# Patient Record
Sex: FEMALE | Race: WHITE | Employment: OTHER | URBAN - METROPOLITAN AREA
[De-identification: names, ages, dates, MRNs, and addresses within clinical notes are randomized per-mention and may not be internally consistent; named-entity substitution may affect disease eponyms.]

---

## 2018-02-08 ENCOUNTER — APPOINTMENT (OUTPATIENT)
Dept: GENERAL RADIOLOGY | Facility: HOSPITAL | Age: 73
End: 2018-02-08
Payer: OTHER MISCELLANEOUS

## 2018-02-08 ENCOUNTER — HOSPITAL ENCOUNTER (EMERGENCY)
Facility: HOSPITAL | Age: 73
Discharge: HOME | End: 2018-02-08
Attending: EMERGENCY MEDICINE
Payer: OTHER MISCELLANEOUS

## 2018-02-08 VITALS
DIASTOLIC BLOOD PRESSURE: 57 MMHG | HEART RATE: 74 BPM | HEIGHT: 64 IN | TEMPERATURE: 98 F | RESPIRATION RATE: 16 BRPM | SYSTOLIC BLOOD PRESSURE: 140 MMHG | WEIGHT: 131 LBS | OXYGEN SATURATION: 99 % | BODY MASS INDEX: 22.36 KG/M2

## 2018-02-08 DIAGNOSIS — S43.401A SPRAIN OF RIGHT SHOULDER, UNSPECIFIED SHOULDER SPRAIN TYPE, INITIAL ENCOUNTER: Primary | ICD-10-CM

## 2018-02-08 DIAGNOSIS — S80.01XA CONTUSION OF RIGHT KNEE, INITIAL ENCOUNTER: ICD-10-CM

## 2018-02-08 PROCEDURE — 73030 X-RAY EXAM OF SHOULDER: CPT

## 2018-02-08 PROCEDURE — 73010 X-RAY EXAM OF SHOULDER BLADE: CPT

## 2018-02-08 PROCEDURE — 99284 EMERGENCY DEPT VISIT MOD MDM: CPT

## 2018-02-08 PROCEDURE — 71101 X-RAY EXAM UNILAT RIBS/CHEST: CPT

## 2018-02-08 PROCEDURE — 73562 X-RAY EXAM OF KNEE 3: CPT

## 2018-02-08 NOTE — ED INITIAL ASSESSMENT (HPI)
Pt work in [employer] ([place]). Was coming in to the building when she tripped on a rug and fall landing on right side. Pt complaining of right shoulder, right scapula, and right knee pain.

## 2018-02-08 NOTE — ED PROVIDER NOTES
Patient Seen in: [facility] Emergency Department    History   Patient presents with:  Trauma (cardiovascular, musculoskeletal)    Stated Complaint: fall, right knee pain, right shoulder, right scapula pain    HPI    Patient presents with right-sided midline C-spine tenderness. Cardiovascular: Regular rate and rhythm, no murmurs. Respiratory: Lungs clear to auscultation. No chest wall tenderness palpation or crepitus.   Abdomen: Soft, nontender, no rebound or guarding, normal active bowel sounds, no fracture, or deformity.  Dictated by: Lacho Medel MD on 2/08/2018 at 14:37     Approved by: Lacho Medel MD            Xr Shoulder, Complete (min 2 Views), Right (cpt=73030)    Result Date: 2/8/2018  PROCEDURE:  XR SHOULDER, COMPLETE (MIN 2 VIEWS), R 1520  ------------------------------------------------------------       MDM     The patient's imaging shows no fracture. I think she has a shoulder sprain and knee contusion. She does not feel that she needs narcotic pain medication.   She is comfortable

## 2018-02-09 ENCOUNTER — APPOINTMENT (OUTPATIENT)
Dept: OTHER | Facility: HOSPITAL | Age: 73
End: 2018-02-09
Attending: PREVENTIVE MEDICINE
Payer: OTHER MISCELLANEOUS

## 2018-02-13 ENCOUNTER — OFFICE VISIT (OUTPATIENT)
Dept: OTHER | Facility: HOSPITAL | Age: 73
End: 2018-02-13
Attending: FAMILY MEDICINE
Payer: OTHER MISCELLANEOUS

## 2018-02-13 DIAGNOSIS — M54.9 BACK PAIN: ICD-10-CM
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
## 2018-02-13 DIAGNOSIS — S46.911A RIGHT SHOULDER STRAIN: ICD-10-CM

## 2018-02-13 RX ORDER — CYCLOBENZAPRINE HCL 5 MG
TABLET ORAL
Qty: 10 TABLET | Refills: 0 | Status: SHIPPED | OUTPATIENT
Start: 2018-02-13 | End: 2018-07-30 | Stop reason: ALTCHOICE

## 2018-02-13 NOTE — PATIENT INSTRUCTIONS
Heat to back    Back exercises given    Flexeril 5 mg. . One at bedtime as needed          Cyclobenzaprine tablets  Brand Names: Fexmid, Flexeril  What is this medicine? CYCLOBENZAPRINE (sye kloe KAT za preen) is a muscle relaxer.  It is used to treat muscl medicines for infection like alfuzosin, chloroquine, clarithromycin, levofloxacin, mefloquine, pentamidine, troleandomycin  · certain medicines for irregular heart beat  · certain medicines for seizures like phenobarbital, primidone  · contrast dyes  · gen drinks. If you are taking another medicine that also causes drowsiness, you may have more side effects. Give your health care provider a list of all medicines you use. Your doctor will tell you how much medicine to take.  Do not take more medicine than dir

## 2018-02-15 ENCOUNTER — OFFICE VISIT (OUTPATIENT)
Dept: INTERNAL MEDICINE CLINIC | Facility: CLINIC | Age: 73
End: 2018-02-15

## 2018-02-15 VITALS
HEART RATE: 92 BPM | BODY MASS INDEX: 22 KG/M2 | RESPIRATION RATE: 16 BRPM | SYSTOLIC BLOOD PRESSURE: 106 MMHG | OXYGEN SATURATION: 98 % | TEMPERATURE: 98 F | DIASTOLIC BLOOD PRESSURE: 64 MMHG | WEIGHT: 130 LBS

## 2018-02-15 DIAGNOSIS — Z12.39 BREAST CANCER SCREENING: ICD-10-CM

## 2018-02-15 DIAGNOSIS — R11.0 NAUSEA: ICD-10-CM

## 2018-02-15 DIAGNOSIS — S43.401D SPRAIN OF RIGHT SHOULDER, UNSPECIFIED SHOULDER SPRAIN TYPE, SUBSEQUENT ENCOUNTER: ICD-10-CM

## 2018-02-15 DIAGNOSIS — F17.200 TOBACCO USE DISORDER: ICD-10-CM

## 2018-02-15 DIAGNOSIS — Z78.0 POSTMENOPAUSAL STATE: ICD-10-CM

## 2018-02-15 DIAGNOSIS — K21.9 GASTROESOPHAGEAL REFLUX DISEASE, ESOPHAGITIS PRESENCE NOT SPECIFIED: Primary | ICD-10-CM

## 2018-02-15 DIAGNOSIS — Z12.11 COLON CANCER SCREENING: ICD-10-CM

## 2018-02-15 DIAGNOSIS — H26.9 CATARACT OF BOTH EYES, UNSPECIFIED CATARACT TYPE: ICD-10-CM

## 2018-02-15 DIAGNOSIS — M19.041 OSTEOARTHRITIS OF BOTH HANDS, UNSPECIFIED OSTEOARTHRITIS TYPE: ICD-10-CM

## 2018-02-15 DIAGNOSIS — M19.042 OSTEOARTHRITIS OF BOTH HANDS, UNSPECIFIED OSTEOARTHRITIS TYPE: ICD-10-CM

## 2018-02-15 PROBLEM — F32.A DEPRESSIVE DISORDER: Status: ACTIVE | Noted: 2018-02-15

## 2018-02-15 PROCEDURE — 99204 OFFICE O/P NEW MOD 45 MIN: CPT | Performed by: STUDENT IN AN ORGANIZED HEALTH CARE EDUCATION/TRAINING PROGRAM

## 2018-02-15 RX ORDER — ACETAMINOPHEN 325 MG/1
650 TABLET ORAL EVERY 6 HOURS PRN
Qty: 60 TABLET | Refills: 0 | COMMUNITY
Start: 2018-02-15 | End: 2019-04-22

## 2018-02-15 NOTE — PROGRESS NOTES
[facility]    REASON FOR VISIT:    Current Complaints: Patient presents with:  ER F/U: [place]  Nausea: When drinks coffe. HPI:  Sussy Garcia is a 71year old female who presents for ER visit f/u. She is a new patient to the office.   Олег Peoples denies SI/HI. ALLERGIES:   No Known Allergies    CURRENT MEDICATIONS:     Current Outpatient Prescriptions:  Omeprazole 40 MG Oral Capsule Delayed Release Take 1 capsule (40 mg total) by mouth daily.  Disp: 30 capsule Rfl: 0   acetaminophen 325 MG Oral round, and reactive to light. No scleral icterus. Neck: Normal range of motion. No thyromegaly present. Cardiovascular: Normal rate, regular rhythm and normal heart sounds. Exam reveals no friction rub. No murmur heard.   Pulmonary/Chest: Effort nor osteoarthritis type  -     acetaminophen 325 MG Oral Tab; Take 2 tablets (650 mg total) by mouth every 6 (six) hours as needed for Pain. Medications side effects, risk and benefits discussed in length.  After visit instructions are provided to the hoang

## 2018-02-19 PROBLEM — M19.041 OSTEOARTHRITIS OF BOTH HANDS: Status: ACTIVE | Noted: 2018-02-19

## 2018-02-19 PROBLEM — M19.042 OSTEOARTHRITIS OF BOTH HANDS: Status: ACTIVE | Noted: 2018-02-19

## 2018-02-19 RX ORDER — OMEPRAZOLE 40 MG/1
40 CAPSULE, DELAYED RELEASE ORAL DAILY
Qty: 30 CAPSULE | Refills: 0 | COMMUNITY
Start: 2018-02-19 | End: 2018-08-14 | Stop reason: ALTCHOICE

## 2018-02-20 ENCOUNTER — APPOINTMENT (OUTPATIENT)
Dept: OTHER | Facility: HOSPITAL | Age: 73
End: 2018-02-20
Attending: PREVENTIVE MEDICINE
Payer: OTHER MISCELLANEOUS

## 2018-02-23 ENCOUNTER — HOSPITAL ENCOUNTER (OUTPATIENT)
Dept: BONE DENSITY | Age: 73
Discharge: HOME | End: 2018-02-23
Attending: STUDENT IN AN ORGANIZED HEALTH CARE EDUCATION/TRAINING PROGRAM
Payer: COMMERCIAL

## 2018-02-23 ENCOUNTER — HOSPITAL ENCOUNTER (OUTPATIENT)
Dept: MAMMOGRAPHY | Age: 73
Discharge: HOME | End: 2018-02-23
Attending: STUDENT IN AN ORGANIZED HEALTH CARE EDUCATION/TRAINING PROGRAM
Payer: COMMERCIAL

## 2018-02-23 DIAGNOSIS — Z78.0 POSTMENOPAUSAL STATE: ICD-10-CM

## 2018-02-23 DIAGNOSIS — Z12.39 BREAST CANCER SCREENING: ICD-10-CM

## 2018-02-23 PROCEDURE — 77067 SCR MAMMO BI INCL CAD: CPT | Performed by: STUDENT IN AN ORGANIZED HEALTH CARE EDUCATION/TRAINING PROGRAM

## 2018-02-23 PROCEDURE — 77080 DXA BONE DENSITY AXIAL: CPT | Performed by: STUDENT IN AN ORGANIZED HEALTH CARE EDUCATION/TRAINING PROGRAM

## 2018-02-28 ENCOUNTER — OFFICE VISIT (OUTPATIENT)
Dept: INTERNAL MEDICINE CLINIC | Facility: CLINIC | Age: 73
End: 2018-02-28

## 2018-02-28 VITALS
BODY MASS INDEX: 22 KG/M2 | SYSTOLIC BLOOD PRESSURE: 106 MMHG | DIASTOLIC BLOOD PRESSURE: 66 MMHG | OXYGEN SATURATION: 98 % | WEIGHT: 130 LBS | RESPIRATION RATE: 16 BRPM | TEMPERATURE: 98 F | HEART RATE: 86 BPM

## 2018-02-28 DIAGNOSIS — M19.041 OSTEOARTHRITIS OF BOTH HANDS, UNSPECIFIED OSTEOARTHRITIS TYPE: ICD-10-CM

## 2018-02-28 DIAGNOSIS — M81.0 OSTEOPOROSIS OF MULTIPLE SITES WITHOUT PATHOLOGICAL FRACTURE: Primary | ICD-10-CM

## 2018-02-28 DIAGNOSIS — M19.042 OSTEOARTHRITIS OF BOTH HANDS, UNSPECIFIED OSTEOARTHRITIS TYPE: ICD-10-CM

## 2018-02-28 PROCEDURE — 99214 OFFICE O/P EST MOD 30 MIN: CPT | Performed by: STUDENT IN AN ORGANIZED HEALTH CARE EDUCATION/TRAINING PROGRAM

## 2018-03-01 ENCOUNTER — TELEPHONE (OUTPATIENT)
Dept: INTERNAL MEDICINE CLINIC | Facility: CLINIC | Age: 73
End: 2018-03-01

## 2018-03-01 NOTE — TELEPHONE ENCOUNTER
Dr. Agnes Melendez has placed referral for Endo. She can see Dr. Sandy Perez or any of her partners.      Estephanie Trotter MD  Endocrinology, Diabetes and Metabolism  [facility] [address]  [address]  Phone: [phone]  Fax: [phone]    Patient is

## 2018-03-01 NOTE — TELEPHONE ENCOUNTER
Patient called back, and I provided Dr. Pollard Empmateor information; she will call to schedule an appointment.

## 2018-03-02 ENCOUNTER — MED REC SCAN ONLY (OUTPATIENT)
Dept: INTERNAL MEDICINE CLINIC | Facility: CLINIC | Age: 73
End: 2018-03-02

## 2018-03-02 NOTE — PROGRESS NOTES
[facility]    REASON FOR VISIT:    Current Complaints: Patient presents with:  Osteoporosis: Discussion      HPI:  Mary Baron is a 71year old female who presents today for recent DEXA scan report review.   Patient's DEXA scan was positive fo for urgency, frequency and difficulty urinating. Musculoskeletal: Negative for arthralgias and gait problem. Skin: Negative for color change and rash.      EXAM:   /66   Pulse 86   Temp 98 °F (36.7 °C) (Oral)   Resp 16   Wt 130 lb   SpO2 98%   BMI for pain control the previous visit. Patient reports improvement in symptoms. After visit instructions are provided to the patient. The patient indicates understanding of these issues and agrees to the treatment plan.   The patient is asked to return

## 2018-03-08 ENCOUNTER — HOSPITAL ENCOUNTER (OUTPATIENT)
Dept: MAMMOGRAPHY | Facility: HOSPITAL | Age: 73
Discharge: HOME | End: 2018-03-08
Attending: STUDENT IN AN ORGANIZED HEALTH CARE EDUCATION/TRAINING PROGRAM
Payer: COMMERCIAL

## 2018-03-08 DIAGNOSIS — R92.2 INCONCLUSIVE MAMMOGRAM: ICD-10-CM

## 2018-03-08 PROCEDURE — 77061 BREAST TOMOSYNTHESIS UNI: CPT | Performed by: STUDENT IN AN ORGANIZED HEALTH CARE EDUCATION/TRAINING PROGRAM

## 2018-03-08 PROCEDURE — 76642 ULTRASOUND BREAST LIMITED: CPT | Performed by: STUDENT IN AN ORGANIZED HEALTH CARE EDUCATION/TRAINING PROGRAM

## 2018-03-08 PROCEDURE — 77065 DX MAMMO INCL CAD UNI: CPT | Performed by: STUDENT IN AN ORGANIZED HEALTH CARE EDUCATION/TRAINING PROGRAM

## 2018-03-09 ENCOUNTER — TELEPHONE (OUTPATIENT)
Dept: INTERNAL MEDICINE CLINIC | Facility: CLINIC | Age: 73
End: 2018-03-09

## 2018-03-09 DIAGNOSIS — R92.8 ABNORMAL MAMMOGRAM: Primary | ICD-10-CM

## 2018-03-09 DIAGNOSIS — R92.2 DENSE BREAST TISSUE ON MAMMOGRAM: ICD-10-CM

## 2018-03-09 NOTE — TELEPHONE ENCOUNTER
----- Message from Kojo Amador MD sent at 3/9/2018  2:27 AM CST -----  Please, notify the patient that she has some focal asymmetry in the upper outer quadrant of the left breast. Findings likely represent asymmetric breast parenchyma.  Recommend 6 ranjit

## 2018-03-09 NOTE — TELEPHONE ENCOUNTER
----- Message from Sumit Corral MD sent at 3/9/2018  2:27 AM CST -----  Please, notify the patient that she has some focal asymmetry in the upper outer quadrant of the left breast. Findings likely represent asymmetric breast parenchyma.  Recommend 6 ranjit

## 2018-03-12 ENCOUNTER — OFFICE VISIT (OUTPATIENT)
Dept: OTHER | Facility: HOSPITAL | Age: 73
End: 2018-03-12
Attending: PREVENTIVE MEDICINE
Payer: OTHER MISCELLANEOUS
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
## 2018-03-12 DIAGNOSIS — M25.551 RIGHT HIP PAIN: ICD-10-CM

## 2018-03-12 DIAGNOSIS — M54.50 LUMBAR PAIN: ICD-10-CM

## 2018-03-26 ENCOUNTER — APPOINTMENT (OUTPATIENT)
Dept: OTHER | Facility: HOSPITAL | Age: 73
End: 2018-03-26
Attending: PREVENTIVE MEDICINE
Payer: OTHER MISCELLANEOUS

## 2018-03-28 ENCOUNTER — OFFICE VISIT (OUTPATIENT)
Dept: INTERNAL MEDICINE CLINIC | Facility: CLINIC | Age: 73
End: 2018-03-28

## 2018-03-28 VITALS
SYSTOLIC BLOOD PRESSURE: 118 MMHG | DIASTOLIC BLOOD PRESSURE: 66 MMHG | RESPIRATION RATE: 16 BRPM | TEMPERATURE: 98 F | OXYGEN SATURATION: 98 % | BODY MASS INDEX: 22 KG/M2 | HEART RATE: 80 BPM | WEIGHT: 127 LBS

## 2018-03-28 DIAGNOSIS — H25.9 SENILE CATARACT OF LEFT EYE, UNSPECIFIED AGE-RELATED CATARACT TYPE: ICD-10-CM

## 2018-03-28 DIAGNOSIS — Z01.810 PREOPERATIVE CARDIOVASCULAR EXAMINATION: Primary | ICD-10-CM

## 2018-03-28 PROCEDURE — 99243 OFF/OP CNSLTJ NEW/EST LOW 30: CPT | Performed by: STUDENT IN AN ORGANIZED HEALTH CARE EDUCATION/TRAINING PROGRAM

## 2018-03-28 NOTE — PROGRESS NOTES
[facility]  H&P Note    HPI:   Naye Oconnor is a 71year old female who presents for cardiac risk assesment and pre-operative physical exam prior to Left eye cataract extraction w/intraocular lens implant to be done by Dr. Shraddha Maciel on 4/3/18 David Sow Rfl:    TraZODone HCl (DESYREL) 100 MG Oral Tab Take 100 mg by mouth nightly. Disp:  Rfl:    Oxybutynin Chloride (DITROPAN) 5 MG Oral Tab Take 5 mg by mouth 3 (three) times daily.  Disp:  Rfl:        Allergies:  No Known Allergies    Social History:  Social numbness. Hematological: Negative for adenopathy. Does not bruise/bleed easily. Psychiatric/Behavioral: Negative for confusion and agitation. The patient is not nervous/anxious.       EXAM:   /66   Pulse 80   Temp 98 °F (36.7 °C) (Oral)   Resp 16 with low cardiac risk. She will not require further cardiovascular testing. She will not require perioperative beta-blockade. This consult was sent back to the referring physician.     Patient Active Problem List:     Tobacco use disorder     Depressive

## 2018-04-02 DIAGNOSIS — R32 URINARY INCONTINENCE, UNSPECIFIED TYPE: Primary | ICD-10-CM

## 2018-04-02 NOTE — TELEPHONE ENCOUNTER
Pt requesting refills of TraZODone HCl (DESYREL) 100 MG and Oxybutynin Chloride (DITROPAN) 5 MG - send to [facility] [address] in [place].

## 2018-04-05 ENCOUNTER — HOSPITAL ENCOUNTER (OUTPATIENT)
Dept: MRI IMAGING | Facility: HOSPITAL | Age: 73
Discharge: HOME | End: 2018-04-05
Attending: PREVENTIVE MEDICINE
Payer: OTHER MISCELLANEOUS

## 2018-04-05 DIAGNOSIS — M54.50 CHRONIC LUMBAR PAIN: ICD-10-CM

## 2018-04-05 DIAGNOSIS — G89.29 CHRONIC LUMBAR PAIN: ICD-10-CM

## 2018-04-05 PROCEDURE — 72148 MRI LUMBAR SPINE W/O DYE: CPT | Performed by: PREVENTIVE MEDICINE

## 2018-04-05 RX ORDER — TRAZODONE HYDROCHLORIDE 100 MG/1
100 TABLET ORAL NIGHTLY
Qty: 30 TABLET | Refills: 0 | OUTPATIENT
Start: 2018-04-05

## 2018-04-05 RX ORDER — OXYBUTYNIN CHLORIDE 5 MG/1
5 TABLET ORAL 3 TIMES DAILY
Qty: 90 TABLET | Refills: 1 | Status: SHIPPED | OUTPATIENT
Start: 2018-04-05 | End: 2018-05-14

## 2018-04-05 NOTE — TELEPHONE ENCOUNTER
Per Dr. Francois Crain, patient was referred to Psych for further Trazodone management. Ok to refill Oxybutynin. LOV 3/28/18. Spoke with patient and relayed above. She verbalized understanding and states she only needs refill on Oxybutynin.    Refill sent t

## 2018-04-09 ENCOUNTER — APPOINTMENT (OUTPATIENT)
Dept: OTHER | Facility: HOSPITAL | Age: 73
End: 2018-04-09
Attending: PREVENTIVE MEDICINE
Payer: OTHER MISCELLANEOUS

## 2018-05-14 ENCOUNTER — OFFICE VISIT (OUTPATIENT)
Dept: INTERNAL MEDICINE CLINIC | Facility: CLINIC | Age: 73
End: 2018-05-14

## 2018-05-14 VITALS
SYSTOLIC BLOOD PRESSURE: 120 MMHG | BODY MASS INDEX: 24.48 KG/M2 | OXYGEN SATURATION: 98 % | RESPIRATION RATE: 16 BRPM | DIASTOLIC BLOOD PRESSURE: 64 MMHG | HEIGHT: 62 IN | WEIGHT: 133 LBS | TEMPERATURE: 98 F | HEART RATE: 74 BPM

## 2018-05-14 DIAGNOSIS — E78.2 MIXED HYPERLIPIDEMIA: Primary | ICD-10-CM

## 2018-05-14 DIAGNOSIS — Z13.220 SCREENING FOR LIPOID DISORDERS: ICD-10-CM

## 2018-05-14 DIAGNOSIS — M79.673 CHRONIC FOOT PAIN, UNSPECIFIED LATERALITY: ICD-10-CM

## 2018-05-14 DIAGNOSIS — R32 URINARY INCONTINENCE, UNSPECIFIED TYPE: ICD-10-CM

## 2018-05-14 DIAGNOSIS — Z13.1 SCREENING FOR DIABETES MELLITUS: ICD-10-CM

## 2018-05-14 DIAGNOSIS — Z13.228 SCREENING FOR METABOLIC DISORDER: ICD-10-CM

## 2018-05-14 DIAGNOSIS — Z13.29 SCREENING FOR THYROID DISORDER: ICD-10-CM

## 2018-05-14 DIAGNOSIS — Z13.89 SCREENING FOR GENITOURINARY CONDITION: ICD-10-CM

## 2018-05-14 DIAGNOSIS — E55.9 VITAMIN D DEFICIENCY: ICD-10-CM

## 2018-05-14 DIAGNOSIS — Z13.0 SCREENING FOR IRON DEFICIENCY ANEMIA: ICD-10-CM

## 2018-05-14 DIAGNOSIS — Z23 NEED FOR PNEUMOCOCCAL VACCINATION: ICD-10-CM

## 2018-05-14 DIAGNOSIS — G89.29 CHRONIC FOOT PAIN, UNSPECIFIED LATERALITY: ICD-10-CM

## 2018-05-14 PROCEDURE — 99214 OFFICE O/P EST MOD 30 MIN: CPT | Performed by: STUDENT IN AN ORGANIZED HEALTH CARE EDUCATION/TRAINING PROGRAM

## 2018-05-14 PROCEDURE — 90670 PCV13 VACCINE IM: CPT | Performed by: STUDENT IN AN ORGANIZED HEALTH CARE EDUCATION/TRAINING PROGRAM

## 2018-05-14 PROCEDURE — 90471 IMMUNIZATION ADMIN: CPT | Performed by: STUDENT IN AN ORGANIZED HEALTH CARE EDUCATION/TRAINING PROGRAM

## 2018-05-14 RX ORDER — PREDNISOLONE ACETATE 10 MG/ML
SUSPENSION/ DROPS OPHTHALMIC
Refills: 0 | COMMUNITY
Start: 2018-03-19 | End: 2018-08-14 | Stop reason: ALTCHOICE

## 2018-05-14 RX ORDER — OFLOXACIN 3 MG/ML
SOLUTION/ DROPS OPHTHALMIC
Refills: 0 | COMMUNITY
Start: 2018-03-19 | End: 2018-08-14 | Stop reason: ALTCHOICE

## 2018-05-14 RX ORDER — OXYBUTYNIN CHLORIDE 5 MG/1
5 TABLET ORAL 3 TIMES DAILY
Qty: 90 TABLET | Refills: 1 | Status: SHIPPED | OUTPATIENT
Start: 2018-05-14 | End: 2018-07-30

## 2018-05-14 RX ORDER — ATORVASTATIN CALCIUM 10 MG/1
10 TABLET, FILM COATED ORAL NIGHTLY
Qty: 90 TABLET | Refills: 1 | Status: SHIPPED | OUTPATIENT
Start: 2018-05-14 | End: 2018-07-30

## 2018-05-14 NOTE — PATIENT INSTRUCTIONS
Medicine for Cholesterol Control  Cholesterol is a waxy substance in your bloodstream. If there is too much of it in your blood, it can build up in the walls of your arteries. Over time, this buildup can lead to coronary disease.  Coronary disease can put Medicines can cause side effects. These often occur at the start of taking a new medicine. Side effects can include headache and upset stomach. Rarely you can have muscle aches. Tell your healthcare provider about any side effects you have.   When to call y Make a plan to have regular cholesterol checks. You may need to fast before getting this test. Also ask your provider about any side effects your medicines may cause. Let your provider know about any side effects you have.  You may need to take more than on

## 2018-05-15 PROBLEM — N32.81 OVERACTIVE BLADDER: Status: ACTIVE | Noted: 2018-05-15

## 2018-05-15 NOTE — PROGRESS NOTES
[facility]    REASON FOR VISIT:    Current Complaints: Patient presents with:  Hyperlipidemia: med check      HPI:  Kristan Allison is a 71year old female who presents for for follow-up visit.   Patient is due for her blood work and it will be or Constitutional: Negative for fever, chills and fatigue. Neurological: Patient is alert and oriented to person, place and time. Reflexes are normal.   HENT: Negative for hearing loss, congestion, sore throat and neck pain.     Eyes: Negative for pain an hyperlipidemia. Diagnoses and all orders for this visit:    Mixed hyperlipidemia    Check lipid panel. Continue atorvastatin at the current dose for now. Urged the patient to quit smoking since it is increasing her risk for cardiovascular disease.   -

## 2018-07-30 ENCOUNTER — OFFICE VISIT (OUTPATIENT)
Dept: INTERNAL MEDICINE CLINIC | Facility: CLINIC | Age: 73
End: 2018-07-30
Payer: COMMERCIAL

## 2018-07-30 VITALS
WEIGHT: 129 LBS | BODY MASS INDEX: 24 KG/M2 | TEMPERATURE: 98 F | OXYGEN SATURATION: 97 % | SYSTOLIC BLOOD PRESSURE: 116 MMHG | RESPIRATION RATE: 16 BRPM | DIASTOLIC BLOOD PRESSURE: 76 MMHG | HEART RATE: 78 BPM

## 2018-07-30 DIAGNOSIS — R32 URINARY INCONTINENCE, UNSPECIFIED TYPE: ICD-10-CM

## 2018-07-30 DIAGNOSIS — F51.04 PSYCHOPHYSIOLOGICAL INSOMNIA: ICD-10-CM

## 2018-07-30 DIAGNOSIS — F32.A DEPRESSIVE DISORDER: ICD-10-CM

## 2018-07-30 DIAGNOSIS — E78.2 MIXED HYPERLIPIDEMIA: ICD-10-CM

## 2018-07-30 DIAGNOSIS — Z01.810 PREOPERATIVE CARDIOVASCULAR EXAMINATION: Primary | ICD-10-CM

## 2018-07-30 PROCEDURE — 99244 OFF/OP CNSLTJ NEW/EST MOD 40: CPT | Performed by: STUDENT IN AN ORGANIZED HEALTH CARE EDUCATION/TRAINING PROGRAM

## 2018-07-30 RX ORDER — ATORVASTATIN CALCIUM 10 MG/1
10 TABLET, FILM COATED ORAL NIGHTLY
Qty: 90 TABLET | Refills: 1 | Status: SHIPPED | OUTPATIENT
Start: 2018-07-30 | End: 2018-11-14

## 2018-07-30 RX ORDER — SERTRALINE HYDROCHLORIDE 100 MG/1
150 TABLET, FILM COATED ORAL DAILY
Qty: 135 TABLET | Refills: 0 | Status: SHIPPED | OUTPATIENT
Start: 2018-07-30 | End: 2018-11-12

## 2018-07-30 RX ORDER — TRAZODONE HYDROCHLORIDE 100 MG/1
100 TABLET ORAL NIGHTLY
Qty: 90 TABLET | Refills: 0 | Status: SHIPPED | OUTPATIENT
Start: 2018-07-30 | End: 2018-10-30

## 2018-07-30 RX ORDER — OXYBUTYNIN CHLORIDE 5 MG/1
5 TABLET ORAL 3 TIMES DAILY
Qty: 90 TABLET | Refills: 1 | Status: SHIPPED | OUTPATIENT
Start: 2018-07-30 | End: 2018-09-22

## 2018-07-30 NOTE — PROGRESS NOTES
[facility] [place] [facility]  H&P Note    HPI:   Mingo Bella is a 71year old female who presents for cardiac risk assesment and pre-operative physical exam prior to anterior to posterior lumbar/thoracic minimally invasive fusion L5-S1 to be done at [facility] mouth 3 (three) times daily. Disp: 90 tablet Rfl: 1   atorvastatin (LIPITOR) 10 MG Oral Tab Take 1 tablet (10 mg total) by mouth nightly. Disp: 90 tablet Rfl: 1   Omeprazole 40 MG Oral Capsule Delayed Release Take 1 capsule (40 mg total) by mouth daily.  Lewis Clifford Gastrointestinal: Negative for nausea, vomiting, abdominal pain and diarrhea. Genitourinary: Negative for urgency, frequency and difficulty urinating. Musculoskeletal: Negative for arthralgias and gait problem. Reports low back pain.   Skin: Negative minimally invasive fusion L5-S1 to be done at [facility] [place] by Benjamin Robison on 8/14/18. The patient has a functional capacity of greater than 4 METs.  According to the ACC/AHA guidelines, the patient does not have a history of acute coronary synd

## 2018-08-07 LAB
AMB EXT BILIRUBIN URINE: NEGATIVE
AMB EXT BLOOD URINE: NEGATIVE
AMB EXT CREATININE: 0.68 MG/DL
AMB EXT GLUCOSE URINE: NEGATIVE
AMB EXT GLUCOSE: 102 MG/DL
AMB EXT HEMATOCRIT: 41.5
AMB EXT HEMOGLOBIN: 13.9
AMB EXT KETONES URINE: NEGATIVE
AMB EXT LEUKOCYTE ESTERASE URINE: NEGATIVE
AMB EXT MCV: 98.3
AMB EXT NITRITE URINE: NEGATIVE
AMB EXT PH URINE: 7.5
AMB EXT PLATELETS: 176
AMB EXT PROTEIN URINE: NEGATIVE
AMB EXT URINE COLOR: YELLOW
AMB EXT URINE SPECIFIC GRAVITY: 1.02
AMB EXT UROBILINOGEN URINE: 0.2
AMB EXT WBC: 6.1 X10(3)UL

## 2018-08-14 ENCOUNTER — OFFICE VISIT (OUTPATIENT)
Dept: INTERNAL MEDICINE CLINIC | Facility: CLINIC | Age: 73
End: 2018-08-14
Payer: COMMERCIAL

## 2018-08-14 VITALS
HEART RATE: 96 BPM | SYSTOLIC BLOOD PRESSURE: 128 MMHG | OXYGEN SATURATION: 97 % | DIASTOLIC BLOOD PRESSURE: 64 MMHG | TEMPERATURE: 97 F | HEIGHT: 62 IN | WEIGHT: 132 LBS | RESPIRATION RATE: 16 BRPM | BODY MASS INDEX: 24.29 KG/M2

## 2018-08-14 DIAGNOSIS — R52 BODY ACHES: ICD-10-CM

## 2018-08-14 DIAGNOSIS — R05.9 COUGH: ICD-10-CM

## 2018-08-14 DIAGNOSIS — J00 ACUTE NASOPHARYNGITIS: Primary | ICD-10-CM

## 2018-08-14 DIAGNOSIS — R06.2 WHEEZING: ICD-10-CM

## 2018-08-14 PROCEDURE — 99214 OFFICE O/P EST MOD 30 MIN: CPT | Performed by: NURSE PRACTITIONER

## 2018-08-14 RX ORDER — CODEINE PHOSPHATE AND GUAIFENESIN 10; 100 MG/5ML; MG/5ML
5 SOLUTION ORAL NIGHTLY PRN
Qty: 1 BOTTLE | Refills: 0 | Status: SHIPPED | OUTPATIENT
Start: 2018-08-14 | End: 2018-11-14 | Stop reason: ALTCHOICE

## 2018-08-14 RX ORDER — BENZONATATE 200 MG/1
200 CAPSULE ORAL 3 TIMES DAILY PRN
Qty: 20 CAPSULE | Refills: 0 | Status: SHIPPED | OUTPATIENT
Start: 2018-08-14 | End: 2018-11-14 | Stop reason: ALTCHOICE

## 2018-08-14 RX ORDER — LEVOFLOXACIN 500 MG/1
1 TABLET, FILM COATED ORAL DAILY
Refills: 0 | COMMUNITY
Start: 2018-08-08 | End: 2018-11-14 | Stop reason: ALTCHOICE

## 2018-08-14 RX ORDER — FLUTICASONE PROPIONATE AND SALMETEROL 100; 50 UG/1; UG/1
1 POWDER RESPIRATORY (INHALATION) 2 TIMES DAILY
Qty: 1 PACKAGE | Refills: 0 | COMMUNITY
Start: 2018-08-14 | End: 2018-08-21

## 2018-08-14 NOTE — PATIENT INSTRUCTIONS
Chest xray  Complete levaquin antibiotic as prescribed by Dr. Ira Manley  Cough medicine- take pearls during the day and syrup at night (will cause drowsiness)  Use sudafed to help with sinus congestion and head fullness  Start Advair 1 puff twice a day x 7 day

## 2018-08-14 NOTE — PROGRESS NOTES
HPI:    Patient ID: Amrit Chaudhary is a 71year old female. Patient presents with:  Cough: c/o cough, chest congestion and \"a heavy head\" for the past 2 weeks; Rm 1  Complete Form: will need note for work    Cough   This is a recurrent problem.  The c MCG/DOSE Inhalation Aerosol Powder, Breath Activated Inhale 1 puff into the lungs 2 (two) times daily. Disp: 1 Package Rfl: 0   TraZODone HCl 100 MG Oral Tab Take 1 tablet (100 mg total) by mouth nightly.  Disp: 90 tablet Rfl: 0   Sertraline HCl 100 MG Oral is warm and dry.             ASSESSMENT/PLAN:   Diagnoses and all orders for this visit:    Acute nasopharyngitis/Cough- 2 weeks without improvement from OTC, use prescribed cough medications, complete chest xray to r/o pneumonia,   -     benzonatate 200 MG

## 2018-09-06 ENCOUNTER — TELEPHONE (OUTPATIENT)
Dept: INTERNAL MEDICINE CLINIC | Facility: CLINIC | Age: 73
End: 2018-09-06

## 2018-09-06 NOTE — TELEPHONE ENCOUNTER
EKG and lab results with a letter requesting an addendum note to the pre op OV from 07/30/18, clearing the patient for surgery, received from Dr. Drew Show office at [address] at [facility] and placed in folder.  Recorded result

## 2018-09-06 NOTE — TELEPHONE ENCOUNTER
Kathya from Dr. Chong Fernando office calling to follow up on status of addended final note for patients surgery. Kathya let know that Dr. Xiang Daly will see this encounter tomorrow when she is back in office.

## 2018-09-10 NOTE — TELEPHONE ENCOUNTER
Pre-op note addended, pt cleared for surgery. Faxed paperwork back to Dr Latanya Neves'ss office. Phone [phone]; fax [phone].

## 2018-09-22 DIAGNOSIS — R32 URINARY INCONTINENCE, UNSPECIFIED TYPE: ICD-10-CM

## 2018-09-24 RX ORDER — OXYBUTYNIN CHLORIDE 5 MG/1
TABLET ORAL
Qty: 90 TABLET | Refills: 0 | Status: SHIPPED | OUTPATIENT
Start: 2018-09-24 | End: 2018-10-23

## 2018-09-24 NOTE — TELEPHONE ENCOUNTER
Last OV relevant to medication: 7/30/18  Last refill date: 7/30/2018     #/refills: 90/1  When pt was asked to return for OV: 4 wks  Upcoming appt/reason: none

## 2018-10-23 DIAGNOSIS — R32 URINARY INCONTINENCE, UNSPECIFIED TYPE: ICD-10-CM

## 2018-10-23 RX ORDER — OXYBUTYNIN CHLORIDE 5 MG/1
TABLET ORAL
Qty: 270 TABLET | Refills: 0 | Status: SHIPPED | OUTPATIENT
Start: 2018-10-23 | End: 2018-11-14

## 2018-10-23 NOTE — TELEPHONE ENCOUNTER
oxybutinin    Last OV relevant to medication: 7/30/18 pre-op/incontinence  Last refill date: 9/24/18     #/refills: 90+0 (1 month supply)  When pt was asked to return for OV: 4 wks  Upcoming appt/reason: 12/10/18 f/u back surgery

## 2018-10-30 DIAGNOSIS — F51.04 PSYCHOPHYSIOLOGICAL INSOMNIA: ICD-10-CM

## 2018-10-31 RX ORDER — TRAZODONE HYDROCHLORIDE 100 MG/1
TABLET ORAL
Qty: 90 TABLET | Refills: 0 | Status: SHIPPED | OUTPATIENT
Start: 2018-10-31 | End: 2018-11-14

## 2018-10-31 NOTE — TELEPHONE ENCOUNTER
Rx Request  TraZODone HCl 100 MG Oral Tab    Disp:       90             R: 0      Associated Dx: Psychophysiological insomnia     Last Visit:08/14/2018    Last Refilled:07/30/2018

## 2018-11-12 ENCOUNTER — TELEPHONE (OUTPATIENT)
Dept: INTERNAL MEDICINE CLINIC | Facility: CLINIC | Age: 73
End: 2018-11-12

## 2018-11-12 DIAGNOSIS — F32.A DEPRESSIVE DISORDER: ICD-10-CM

## 2018-11-12 RX ORDER — SERTRALINE HYDROCHLORIDE 100 MG/1
150 TABLET, FILM COATED ORAL DAILY
Qty: 135 TABLET | Refills: 0 | Status: SHIPPED | OUTPATIENT
Start: 2018-11-12 | End: 2018-11-14

## 2018-11-12 NOTE — TELEPHONE ENCOUNTER
Sertraline - out of medication.     Last OV relevant to medication: 7/30/18 pre-op/depression  Last refill date: 7/30/18     #/refills: 135 (90ds)+0  When pt was asked to return for OV: not stated  Upcoming appt/reason: 11/14/18 med check

## 2018-11-12 NOTE — TELEPHONE ENCOUNTER
Pt completely out of Sertraline HCl 100 MG Oral Tab, advised office needs 48-72 hours in the future for refills. To [facility] on [address] in [place].  Thank you

## 2018-11-14 ENCOUNTER — OFFICE VISIT (OUTPATIENT)
Dept: INTERNAL MEDICINE CLINIC | Facility: CLINIC | Age: 73
End: 2018-11-14
Payer: COMMERCIAL

## 2018-11-14 VITALS
DIASTOLIC BLOOD PRESSURE: 82 MMHG | HEIGHT: 62 IN | BODY MASS INDEX: 24.48 KG/M2 | OXYGEN SATURATION: 98 % | RESPIRATION RATE: 16 BRPM | HEART RATE: 82 BPM | SYSTOLIC BLOOD PRESSURE: 130 MMHG | TEMPERATURE: 97 F | WEIGHT: 133 LBS

## 2018-11-14 DIAGNOSIS — F32.A DEPRESSIVE DISORDER: ICD-10-CM

## 2018-11-14 DIAGNOSIS — F17.200 TOBACCO USE DISORDER: ICD-10-CM

## 2018-11-14 DIAGNOSIS — N32.81 OVERACTIVE BLADDER: ICD-10-CM

## 2018-11-14 DIAGNOSIS — E78.2 MIXED HYPERLIPIDEMIA: Primary | ICD-10-CM

## 2018-11-14 DIAGNOSIS — F51.04 PSYCHOPHYSIOLOGICAL INSOMNIA: ICD-10-CM

## 2018-11-14 DIAGNOSIS — R32 URINARY INCONTINENCE, UNSPECIFIED TYPE: ICD-10-CM

## 2018-11-14 PROBLEM — G47.00 INSOMNIA: Status: ACTIVE | Noted: 2018-07-30

## 2018-11-14 PROBLEM — M43.17 SPONDYLOLISTHESIS AT L5-S1 LEVEL: Status: ACTIVE | Noted: 2018-11-14

## 2018-11-14 PROBLEM — E78.5 HLD (HYPERLIPIDEMIA): Status: ACTIVE | Noted: 2018-05-14

## 2018-11-14 PROBLEM — M81.0 OSTEOPOROSIS: Status: ACTIVE | Noted: 2018-02-28

## 2018-11-14 PROCEDURE — 99214 OFFICE O/P EST MOD 30 MIN: CPT | Performed by: STUDENT IN AN ORGANIZED HEALTH CARE EDUCATION/TRAINING PROGRAM

## 2018-11-14 PROCEDURE — 90471 IMMUNIZATION ADMIN: CPT | Performed by: STUDENT IN AN ORGANIZED HEALTH CARE EDUCATION/TRAINING PROGRAM

## 2018-11-14 PROCEDURE — 90653 IIV ADJUVANT VACCINE IM: CPT | Performed by: STUDENT IN AN ORGANIZED HEALTH CARE EDUCATION/TRAINING PROGRAM

## 2018-11-14 RX ORDER — ATORVASTATIN CALCIUM 10 MG/1
10 TABLET, FILM COATED ORAL NIGHTLY
Qty: 90 TABLET | Refills: 1 | Status: SHIPPED | OUTPATIENT
Start: 2018-11-14 | End: 2019-04-22

## 2018-11-14 RX ORDER — OXYBUTYNIN CHLORIDE 5 MG/1
5 TABLET ORAL 3 TIMES DAILY
Qty: 270 TABLET | Refills: 0 | Status: SHIPPED | OUTPATIENT
Start: 2018-11-14 | End: 2019-02-22

## 2018-11-14 RX ORDER — SERTRALINE HYDROCHLORIDE 100 MG/1
150 TABLET, FILM COATED ORAL DAILY
Qty: 135 TABLET | Refills: 0 | Status: SHIPPED | OUTPATIENT
Start: 2018-11-14 | End: 2019-02-12

## 2018-11-14 RX ORDER — TRAZODONE HYDROCHLORIDE 100 MG/1
TABLET ORAL
Qty: 90 TABLET | Refills: 0 | Status: SHIPPED | OUTPATIENT
Start: 2018-11-14 | End: 2019-04-22

## 2018-11-14 NOTE — PROGRESS NOTES
[facility]    REASON FOR VISIT:    Current Complaints: Patient presents with:  Medication Follow-Up      HPI:  Britt Rodriguez is a 71year old female who presents for f/u visit and medications refills.     Patient is due for her blood work and it Neurological: Negative for headaches, dizziness, extremity weakness. HENT: Negative for hearing loss, congestion, sore throat and neck pain. Eyes: Negative for pain and visual disturbance. Respiratory: Negative for cough and shortness of breath. noted. No erythema. Psychiatric: Normal mood and affect and behavior is normal.     ASSESSMENT AND PLAN:   Sanjay Nolan is a 71year old female who presents with    Tad Dubs was seen today for medication follow-up.     Diagnoses and all orders for this

## 2018-11-17 ENCOUNTER — LABORATORY ENCOUNTER (OUTPATIENT)
Dept: LAB | Age: 73
End: 2018-11-17
Attending: STUDENT IN AN ORGANIZED HEALTH CARE EDUCATION/TRAINING PROGRAM
Payer: COMMERCIAL

## 2018-11-17 DIAGNOSIS — E55.9 VITAMIN D DEFICIENCY: ICD-10-CM

## 2018-11-17 DIAGNOSIS — Z13.220 SCREENING FOR LIPOID DISORDERS: ICD-10-CM

## 2018-11-17 DIAGNOSIS — Z13.228 SCREENING FOR METABOLIC DISORDER: ICD-10-CM

## 2018-11-17 DIAGNOSIS — Z13.0 SCREENING FOR IRON DEFICIENCY ANEMIA: ICD-10-CM

## 2018-11-17 DIAGNOSIS — Z13.89 SCREENING FOR GENITOURINARY CONDITION: ICD-10-CM

## 2018-11-17 DIAGNOSIS — Z13.1 SCREENING FOR DIABETES MELLITUS: ICD-10-CM

## 2018-11-17 DIAGNOSIS — Z13.29 SCREENING FOR THYROID DISORDER: ICD-10-CM

## 2018-11-17 PROCEDURE — 80050 GENERAL HEALTH PANEL: CPT | Performed by: STUDENT IN AN ORGANIZED HEALTH CARE EDUCATION/TRAINING PROGRAM

## 2018-11-17 PROCEDURE — 36415 COLL VENOUS BLD VENIPUNCTURE: CPT | Performed by: STUDENT IN AN ORGANIZED HEALTH CARE EDUCATION/TRAINING PROGRAM

## 2018-11-17 PROCEDURE — 83036 HEMOGLOBIN GLYCOSYLATED A1C: CPT | Performed by: STUDENT IN AN ORGANIZED HEALTH CARE EDUCATION/TRAINING PROGRAM

## 2018-11-17 PROCEDURE — 82306 VITAMIN D 25 HYDROXY: CPT | Performed by: STUDENT IN AN ORGANIZED HEALTH CARE EDUCATION/TRAINING PROGRAM

## 2018-11-17 PROCEDURE — 81003 URINALYSIS AUTO W/O SCOPE: CPT | Performed by: STUDENT IN AN ORGANIZED HEALTH CARE EDUCATION/TRAINING PROGRAM

## 2018-11-17 PROCEDURE — 80061 LIPID PANEL: CPT | Performed by: STUDENT IN AN ORGANIZED HEALTH CARE EDUCATION/TRAINING PROGRAM

## 2019-02-22 DIAGNOSIS — R32 URINARY INCONTINENCE, UNSPECIFIED TYPE: ICD-10-CM

## 2019-03-06 RX ORDER — OXYBUTYNIN CHLORIDE 5 MG/1
TABLET ORAL
Qty: 270 TABLET | Refills: 0 | Status: SHIPPED | OUTPATIENT
Start: 2019-03-06 | End: 2019-07-30

## 2019-03-06 NOTE — TELEPHONE ENCOUNTER
Last OV relevant to medication: 11/14/18  Last refill date: 11/14/18     #/refills: 270/0  When pt was asked to return for OV: 3 months  Upcoming appt/reason:  4/22/19

## 2019-04-22 ENCOUNTER — TELEPHONE (OUTPATIENT)
Dept: INTERNAL MEDICINE CLINIC | Facility: CLINIC | Age: 74
End: 2019-04-22

## 2019-04-22 ENCOUNTER — OFFICE VISIT (OUTPATIENT)
Dept: INTERNAL MEDICINE CLINIC | Facility: CLINIC | Age: 74
End: 2019-04-22
Payer: COMMERCIAL

## 2019-04-22 VITALS
TEMPERATURE: 99 F | OXYGEN SATURATION: 96 % | SYSTOLIC BLOOD PRESSURE: 120 MMHG | HEART RATE: 92 BPM | RESPIRATION RATE: 16 BRPM | BODY MASS INDEX: 23.99 KG/M2 | WEIGHT: 130.38 LBS | HEIGHT: 62 IN | DIASTOLIC BLOOD PRESSURE: 70 MMHG

## 2019-04-22 DIAGNOSIS — F32.9 MAJOR DEPRESSIVE DISORDER, REMISSION STATUS UNSPECIFIED, UNSPECIFIED WHETHER RECURRENT: ICD-10-CM

## 2019-04-22 DIAGNOSIS — M25.511 CHRONIC RIGHT SHOULDER PAIN: ICD-10-CM

## 2019-04-22 DIAGNOSIS — Z13.0 SCREENING FOR IRON DEFICIENCY ANEMIA: ICD-10-CM

## 2019-04-22 DIAGNOSIS — Z13.228 SCREENING FOR METABOLIC DISORDER: ICD-10-CM

## 2019-04-22 DIAGNOSIS — Z13.29 SCREENING FOR THYROID DISORDER: ICD-10-CM

## 2019-04-22 DIAGNOSIS — Z13.220 SCREENING FOR LIPOID DISORDERS: ICD-10-CM

## 2019-04-22 DIAGNOSIS — M81.0 AGE-RELATED OSTEOPOROSIS WITHOUT CURRENT PATHOLOGICAL FRACTURE: ICD-10-CM

## 2019-04-22 DIAGNOSIS — R32 URINARY INCONTINENCE, UNSPECIFIED TYPE: ICD-10-CM

## 2019-04-22 DIAGNOSIS — Z12.39 SCREENING FOR BREAST CANCER: ICD-10-CM

## 2019-04-22 DIAGNOSIS — Z00.00 ENCOUNTER FOR ANNUAL PHYSICAL EXAM: Primary | ICD-10-CM

## 2019-04-22 DIAGNOSIS — E55.9 VITAMIN D DEFICIENCY: ICD-10-CM

## 2019-04-22 DIAGNOSIS — E78.2 MIXED HYPERLIPIDEMIA: ICD-10-CM

## 2019-04-22 DIAGNOSIS — G89.29 CHRONIC RIGHT SHOULDER PAIN: ICD-10-CM

## 2019-04-22 DIAGNOSIS — F51.04 PSYCHOPHYSIOLOGICAL INSOMNIA: ICD-10-CM

## 2019-04-22 DIAGNOSIS — Z13.1 SCREENING FOR DIABETES MELLITUS: ICD-10-CM

## 2019-04-22 PROCEDURE — 99397 PER PM REEVAL EST PAT 65+ YR: CPT | Performed by: STUDENT IN AN ORGANIZED HEALTH CARE EDUCATION/TRAINING PROGRAM

## 2019-04-22 RX ORDER — SERTRALINE HYDROCHLORIDE 100 MG/1
200 TABLET, FILM COATED ORAL DAILY
Qty: 180 TABLET | Refills: 1 | Status: SHIPPED | OUTPATIENT
Start: 2019-04-22 | End: 2019-10-23

## 2019-04-22 RX ORDER — TRAZODONE HYDROCHLORIDE 100 MG/1
TABLET ORAL
Qty: 90 TABLET | Refills: 1 | Status: SHIPPED | OUTPATIENT
Start: 2019-04-22 | End: 2019-10-19

## 2019-04-22 RX ORDER — SERTRALINE HYDROCHLORIDE 100 MG/1
TABLET, FILM COATED ORAL
Refills: 1 | COMMUNITY
Start: 2019-03-17 | End: 2019-04-22

## 2019-04-22 RX ORDER — RISEDRONATE SODIUM 150 MG/1
150 TABLET, FILM COATED ORAL
Qty: 12 TABLET | Refills: 0 | Status: SHIPPED | OUTPATIENT
Start: 2019-04-22 | End: 2019-08-14 | Stop reason: ALTCHOICE

## 2019-04-22 RX ORDER — ATORVASTATIN CALCIUM 10 MG/1
10 TABLET, FILM COATED ORAL NIGHTLY
Qty: 90 TABLET | Refills: 1 | Status: SHIPPED | OUTPATIENT
Start: 2019-04-22 | End: 2019-10-22

## 2019-04-22 RX ORDER — ATORVASTATIN CALCIUM 10 MG/1
TABLET, FILM COATED ORAL
COMMUNITY
Start: 2019-02-22 | End: 2019-04-22

## 2019-04-22 RX ORDER — SERTRALINE HYDROCHLORIDE 100 MG/1
100 TABLET, FILM COATED ORAL DAILY
Qty: 180 TABLET | Refills: 1 | Status: SHIPPED | OUTPATIENT
Start: 2019-04-22 | End: 2019-04-22

## 2019-04-22 NOTE — TELEPHONE ENCOUNTER
[address] called, sertraline e-scribed today at [address], note not yet complete. Need clarification if once daily or twice daily.

## 2019-04-22 NOTE — PROGRESS NOTES
[facility]    REASON FOR VISIT:    Karthik Torres is a 68year old female who presents for an [address]. Current Complaints: c/o right shoulder pain since Feb 2018 since she fell.  Was seen by ortho, was diagnosed with rotator c No results found for: CHLAMYDIA    Colonoscopy Screen Every 10 years Colonoscopy due on 02/22/2026    Flex Sigmoidoscopy Screen  Every 5 years No results found for this or any previous visit.     Fecal Occult Blood  Annually No results found for: FOB, OCCUL by mouth daily. Disp: 180 tablet Rfl: 1      MEDICAL INFORMATION:   Past Medical History:   Diagnosis Date   • Depression    • Other and unspecified hyperlipidemia       History reviewed. No pertinent surgical history.    Family History   Problem Relation A reactive to light. No scleral icterus. Neck: Normal range of motion. No thyromegaly present. Cardiovascular: Normal rate, regular rhythm and normal heart sounds. Exam reveals no friction rub. No murmur heard.   Pulmonary/Chest: Effort normal and billy for physical.    Diagnoses and all orders for this visit:    Encounter for annual physical exam  Body mass index is Body mass index is 23.85 kg/m². Recommended low fat diet and exercise 30 minutes three times weekly as tolerates.   Health maintenance: will Gel; Apply 2 g topically 3 (three) times daily. Age-related osteoporosis without current pathological fracture   High calcium protein diet discussed with the patient. Continue vitamin D daily. Start Actonel 150 mg once a months.   Take it with a full g

## 2019-04-23 RX ORDER — OXYBUTYNIN CHLORIDE 5 MG/1
TABLET ORAL
Qty: 270 TABLET | Refills: 0 | OUTPATIENT
Start: 2019-04-23

## 2019-04-24 ENCOUNTER — TELEPHONE (OUTPATIENT)
Dept: INTERNAL MEDICINE CLINIC | Facility: CLINIC | Age: 74
End: 2019-04-24

## 2019-04-24 NOTE — TELEPHONE ENCOUNTER
Received fax that [facility] did not received the oxybutinin order that was e-scribed on 3/6/19, gave verbal on their provider line.

## 2019-07-30 ENCOUNTER — TELEPHONE (OUTPATIENT)
Dept: INTERNAL MEDICINE CLINIC | Facility: CLINIC | Age: 74
End: 2019-07-30

## 2019-07-30 DIAGNOSIS — R32 URINARY INCONTINENCE, UNSPECIFIED TYPE: ICD-10-CM

## 2019-07-30 RX ORDER — OXYBUTYNIN CHLORIDE 5 MG/1
5 TABLET ORAL 3 TIMES DAILY
Qty: 270 TABLET | Refills: 0 | Status: SHIPPED | OUTPATIENT
Start: 2019-07-30 | End: 2019-10-22

## 2019-07-30 NOTE — TELEPHONE ENCOUNTER
Last OV relevant to medication: 4/22/2019  Last refill date: 3/6/19     #/refills: 90/0  When pt was asked to return for OV: 6 months - Multiple Issues  Upcoming appt/reason: 8/14/19 - Dizziness,   10/23/19 - Multiple Issues

## 2019-07-30 NOTE — TELEPHONE ENCOUNTER
Was patient advised to contact pharmacy directly?: Patient stated she already reached out to her pharmacy for a refill. Please advise. Thank you! Is patient out of meds or supply very low?: out     Medication Requested:  Oxybutynin     Is patient reques

## 2019-07-30 NOTE — TELEPHONE ENCOUNTER
Pt called with complaint of dizziness when she lies down for past 2-4 weeks. Typically does not eat breakfast and only eats light meal for lunch, eats dinner regularly -  advised to eat all 3 meals to avoid hypoglycemia.  Offered SDA appt tomorrow, pt pardeep

## 2019-08-14 ENCOUNTER — OFFICE VISIT (OUTPATIENT)
Dept: INTERNAL MEDICINE CLINIC | Facility: CLINIC | Age: 74
End: 2019-08-14
Payer: COMMERCIAL

## 2019-08-14 VITALS
BODY MASS INDEX: 24.26 KG/M2 | HEART RATE: 94 BPM | HEIGHT: 62 IN | OXYGEN SATURATION: 94 % | TEMPERATURE: 99 F | WEIGHT: 131.81 LBS | SYSTOLIC BLOOD PRESSURE: 110 MMHG | RESPIRATION RATE: 14 BRPM | DIASTOLIC BLOOD PRESSURE: 52 MMHG

## 2019-08-14 DIAGNOSIS — H91.92 HEARING LOSS OF LEFT EAR, UNSPECIFIED HEARING LOSS TYPE: ICD-10-CM

## 2019-08-14 DIAGNOSIS — H81.10 BENIGN PAROXYSMAL POSITIONAL VERTIGO, UNSPECIFIED LATERALITY: Primary | ICD-10-CM

## 2019-08-14 PROCEDURE — 99214 OFFICE O/P EST MOD 30 MIN: CPT | Performed by: STUDENT IN AN ORGANIZED HEALTH CARE EDUCATION/TRAINING PROGRAM

## 2019-08-14 RX ORDER — METHYLPREDNISOLONE 4 MG/1
TABLET ORAL
Qty: 1 KIT | Refills: 0 | Status: SHIPPED | OUTPATIENT
Start: 2019-08-14 | End: 2019-09-19 | Stop reason: ALTCHOICE

## 2019-08-14 NOTE — PATIENT INSTRUCTIONS
Benign Paroxysmal Positional Vertigo    Benign paroxysmal positional vertigo is a common condition. You feel as if the room is spinning after changing position, moving your head quickly, or even just rolling over in bed.   Vertigo is a false feeling of mo · Take medicine as prescribed to relieve your symptoms. Unless another medicine was prescribed for nausea, vomiting, and vertigo, you may use over-the-counter motion sickness medicine. Examples of this include meclizine and dimenhydrinate.   Follow-up care

## 2019-08-14 NOTE — PROGRESS NOTES
HPI:    Patient ID: Sanjay Nolan is a 68year old female. Dizziness   This is a chronic problem. The current episode started more than 1 month ago. The problem occurs intermittently. The problem has been waxing and waning.  Associated symptoms include well-developed and well-nourished. HENT:   Head: Normocephalic and atraumatic. Right Ear: Hearing, tympanic membrane, external ear and ear canal normal. No middle ear effusion. Left Ear: External ear and ear canal normal.  No middle ear effusion.  Dec issues and agrees to the plan. The patient is asked to return if symptoms persist or worsen. No orders of the defined types were placed in this encounter.       Meds This Visit:  Requested Prescriptions     Signed Prescriptions Disp Refills   • methylPR

## 2019-08-16 ENCOUNTER — LAB ENCOUNTER (OUTPATIENT)
Dept: LAB | Age: 74
End: 2019-08-16
Attending: STUDENT IN AN ORGANIZED HEALTH CARE EDUCATION/TRAINING PROGRAM
Payer: COMMERCIAL

## 2019-08-16 DIAGNOSIS — Z13.220 SCREENING FOR LIPOID DISORDERS: ICD-10-CM

## 2019-08-16 DIAGNOSIS — Z13.0 SCREENING FOR IRON DEFICIENCY ANEMIA: ICD-10-CM

## 2019-08-16 DIAGNOSIS — Z13.1 SCREENING FOR DIABETES MELLITUS: ICD-10-CM

## 2019-08-16 DIAGNOSIS — Z13.29 SCREENING FOR THYROID DISORDER: ICD-10-CM

## 2019-08-16 DIAGNOSIS — Z13.228 SCREENING FOR METABOLIC DISORDER: ICD-10-CM

## 2019-08-16 DIAGNOSIS — E55.9 VITAMIN D DEFICIENCY: ICD-10-CM

## 2019-08-16 LAB
ALBUMIN SERPL-MCNC: 3.9 G/DL (ref 3.4–5)
ALBUMIN/GLOB SERPL: 1.1 {RATIO} (ref 1–2)
ALP LIVER SERPL-CCNC: 116 U/L (ref 55–142)
ALT SERPL-CCNC: 22 U/L (ref 13–56)
ANION GAP SERPL CALC-SCNC: 5 MMOL/L (ref 0–18)
AST SERPL-CCNC: 15 U/L (ref 15–37)
BASOPHILS # BLD AUTO: 0.04 X10(3) UL (ref 0–0.2)
BASOPHILS NFR BLD AUTO: 0.7 %
BILIRUB SERPL-MCNC: 0.3 MG/DL (ref 0.1–2)
BUN BLD-MCNC: 16 MG/DL (ref 7–18)
BUN/CREAT SERPL: 22.9 (ref 10–20)
CALCIUM BLD-MCNC: 9.5 MG/DL (ref 8.5–10.1)
CHLORIDE SERPL-SCNC: 110 MMOL/L (ref 98–112)
CHOLEST SMN-MCNC: 184 MG/DL (ref ?–200)
CO2 SERPL-SCNC: 28 MMOL/L (ref 21–32)
CREAT BLD-MCNC: 0.7 MG/DL (ref 0.55–1.02)
DEPRECATED RDW RBC AUTO: 54.5 FL (ref 35.1–46.3)
EOSINOPHIL # BLD AUTO: 0.27 X10(3) UL (ref 0–0.7)
EOSINOPHIL NFR BLD AUTO: 4.7 %
ERYTHROCYTE [DISTWIDTH] IN BLOOD BY AUTOMATED COUNT: 14.6 % (ref 11–15)
EST. AVERAGE GLUCOSE BLD GHB EST-MCNC: 117 MG/DL (ref 68–126)
GLOBULIN PLAS-MCNC: 3.4 G/DL (ref 2.8–4.4)
GLUCOSE BLD-MCNC: 89 MG/DL (ref 70–99)
HBA1C MFR BLD HPLC: 5.7 % (ref ?–5.7)
HCT VFR BLD AUTO: 43.8 % (ref 35–48)
HDLC SERPL-MCNC: 62 MG/DL (ref 40–59)
HGB BLD-MCNC: 13.9 G/DL (ref 12–16)
IMM GRANULOCYTES # BLD AUTO: 0.01 X10(3) UL (ref 0–1)
IMM GRANULOCYTES NFR BLD: 0.2 %
LDLC SERPL CALC-MCNC: 98 MG/DL (ref ?–100)
LYMPHOCYTES # BLD AUTO: 1.77 X10(3) UL (ref 1–4)
LYMPHOCYTES NFR BLD AUTO: 30.9 %
M PROTEIN MFR SERPL ELPH: 7.3 G/DL (ref 6.4–8.2)
MCH RBC QN AUTO: 31.9 PG (ref 26–34)
MCHC RBC AUTO-ENTMCNC: 31.7 G/DL (ref 31–37)
MCV RBC AUTO: 100.5 FL (ref 80–100)
MONOCYTES # BLD AUTO: 0.44 X10(3) UL (ref 0.1–1)
MONOCYTES NFR BLD AUTO: 7.7 %
NEUTROPHILS # BLD AUTO: 3.2 X10 (3) UL (ref 1.5–7.7)
NEUTROPHILS # BLD AUTO: 3.2 X10(3) UL (ref 1.5–7.7)
NEUTROPHILS NFR BLD AUTO: 55.8 %
NONHDLC SERPL-MCNC: 122 MG/DL (ref ?–130)
OSMOLALITY SERPL CALC.SUM OF ELEC: 297 MOSM/KG (ref 275–295)
PLATELET # BLD AUTO: 214 10(3)UL (ref 150–450)
POTASSIUM SERPL-SCNC: 4.5 MMOL/L (ref 3.5–5.1)
RBC # BLD AUTO: 4.36 X10(6)UL (ref 3.8–5.3)
SODIUM SERPL-SCNC: 143 MMOL/L (ref 136–145)
TRIGL SERPL-MCNC: 122 MG/DL (ref 30–149)
TSI SER-ACNC: 1.83 MIU/ML (ref 0.36–3.74)
VIT D+METAB SERPL-MCNC: 41.9 NG/ML (ref 30–100)
VLDLC SERPL CALC-MCNC: 24 MG/DL (ref 0–30)
WBC # BLD AUTO: 5.7 X10(3) UL (ref 4–11)

## 2019-08-16 PROCEDURE — 82306 VITAMIN D 25 HYDROXY: CPT | Performed by: STUDENT IN AN ORGANIZED HEALTH CARE EDUCATION/TRAINING PROGRAM

## 2019-08-16 PROCEDURE — 36415 COLL VENOUS BLD VENIPUNCTURE: CPT | Performed by: STUDENT IN AN ORGANIZED HEALTH CARE EDUCATION/TRAINING PROGRAM

## 2019-08-16 PROCEDURE — 80061 LIPID PANEL: CPT | Performed by: STUDENT IN AN ORGANIZED HEALTH CARE EDUCATION/TRAINING PROGRAM

## 2019-08-16 PROCEDURE — 83036 HEMOGLOBIN GLYCOSYLATED A1C: CPT | Performed by: STUDENT IN AN ORGANIZED HEALTH CARE EDUCATION/TRAINING PROGRAM

## 2019-08-16 PROCEDURE — 80050 GENERAL HEALTH PANEL: CPT | Performed by: STUDENT IN AN ORGANIZED HEALTH CARE EDUCATION/TRAINING PROGRAM

## 2019-09-09 ENCOUNTER — TELEPHONE (OUTPATIENT)
Dept: INTERNAL MEDICINE CLINIC | Facility: CLINIC | Age: 74
End: 2019-09-09

## 2019-09-09 DIAGNOSIS — R19.7 DIARRHEA, UNSPECIFIED TYPE: Primary | ICD-10-CM

## 2019-09-09 NOTE — TELEPHONE ENCOUNTER
It's likely related to her Abx. I recommend to stop cefalexin. She needs to hydrate vigorously. Check stool for C.Dif. Ok to place order if pt refuses to go to UC/ER.  I still recommend UC eval since dehydration can be very dangerous and if she cannot keep

## 2019-09-09 NOTE — TELEPHONE ENCOUNTER
Incoming (mail or fax):  Fax  Received from: [facility]   Documentation given to: Triage    *Physician Signature Requested Statement Selected

## 2019-09-09 NOTE — TELEPHONE ENCOUNTER
Pt called with complaint of diarrhea x2 days. Has had 4 watery stools this morning by 930am. Had ortho surgery at [facility] 8/30/19. Has been on cephalexin since 8/30/19. prescribed as 4x daily, is only taking 2x daily. Feels shaky and weak.  \"anything she eats

## 2019-09-09 NOTE — TELEPHONE ENCOUNTER
Received fax from [facility]  Orders for admission to [facility], Nursing, PT & OT eval/tx  To Dr. Juhi Velarde for review & signature

## 2019-09-09 NOTE — TELEPHONE ENCOUNTER
Spoke with pt, advised to stop abx. Urged to find ride to go to [facility] for eval and hydration. Pt still unable to go. If truly unable to go, she will need to vigorously push fluids as dehydration dangerous. Pt agreeable.   Also advised if unable to to go to

## 2019-09-16 ENCOUNTER — TELEPHONE (OUTPATIENT)
Dept: INTERNAL MEDICINE CLINIC | Facility: CLINIC | Age: 74
End: 2019-09-16

## 2019-09-16 NOTE — TELEPHONE ENCOUNTER
Spoke with pt. States this morning, her stool was very soft, not watery. Pt c/o stomach hurts very bad when having to have BM. Pt reports she followed the instructions, stopped antibiotics & increased fluids, eating bland foods.   Pt states stomach pain

## 2019-09-19 ENCOUNTER — OFFICE VISIT (OUTPATIENT)
Dept: INTERNAL MEDICINE CLINIC | Facility: CLINIC | Age: 74
End: 2019-09-19
Payer: COMMERCIAL

## 2019-09-19 ENCOUNTER — LAB ENCOUNTER (OUTPATIENT)
Dept: LAB | Age: 74
End: 2019-09-19
Attending: INTERNAL MEDICINE
Payer: COMMERCIAL

## 2019-09-19 VITALS
OXYGEN SATURATION: 97 % | SYSTOLIC BLOOD PRESSURE: 122 MMHG | HEART RATE: 79 BPM | HEIGHT: 62 IN | RESPIRATION RATE: 14 BRPM | DIASTOLIC BLOOD PRESSURE: 64 MMHG | TEMPERATURE: 99 F | BODY MASS INDEX: 24 KG/M2

## 2019-09-19 DIAGNOSIS — N39.0 URINARY TRACT INFECTION WITH HEMATURIA, SITE UNSPECIFIED: ICD-10-CM

## 2019-09-19 DIAGNOSIS — N39.0 URINARY TRACT INFECTION WITH HEMATURIA, SITE UNSPECIFIED: Primary | ICD-10-CM

## 2019-09-19 DIAGNOSIS — R53.83 FATIGUE, UNSPECIFIED TYPE: ICD-10-CM

## 2019-09-19 DIAGNOSIS — R06.00 DYSPNEA ON EXERTION: ICD-10-CM

## 2019-09-19 DIAGNOSIS — R31.9 URINARY TRACT INFECTION WITH HEMATURIA, SITE UNSPECIFIED: Primary | ICD-10-CM

## 2019-09-19 DIAGNOSIS — R31.9 URINARY TRACT INFECTION WITH HEMATURIA, SITE UNSPECIFIED: ICD-10-CM

## 2019-09-19 LAB
ALBUMIN SERPL-MCNC: 3.8 G/DL (ref 3.4–5)
ALBUMIN/GLOB SERPL: 1.1 {RATIO} (ref 1–2)
ALP LIVER SERPL-CCNC: 137 U/L (ref 55–142)
ALT SERPL-CCNC: 11 U/L (ref 13–56)
ANION GAP SERPL CALC-SCNC: 3 MMOL/L (ref 0–18)
AST SERPL-CCNC: 12 U/L (ref 15–37)
BASOPHILS # BLD AUTO: 0.03 X10(3) UL (ref 0–0.2)
BASOPHILS NFR BLD AUTO: 0.3 %
BILIRUB SERPL-MCNC: 0.5 MG/DL (ref 0.1–2)
BUN BLD-MCNC: 17 MG/DL (ref 7–18)
BUN/CREAT SERPL: 21.3 (ref 10–20)
CALCIUM BLD-MCNC: 10.2 MG/DL (ref 8.5–10.1)
CHLORIDE SERPL-SCNC: 105 MMOL/L (ref 98–112)
CO2 SERPL-SCNC: 27 MMOL/L (ref 21–32)
CREAT BLD-MCNC: 0.8 MG/DL (ref 0.55–1.02)
D-DIMER: 0.28 UG/ML FEU (ref ?–0.72)
DEPRECATED RDW RBC AUTO: 51.5 FL (ref 35.1–46.3)
EOSINOPHIL # BLD AUTO: 0.04 X10(3) UL (ref 0–0.7)
EOSINOPHIL NFR BLD AUTO: 0.4 %
ERYTHROCYTE [DISTWIDTH] IN BLOOD BY AUTOMATED COUNT: 14.2 % (ref 11–15)
GLOBULIN PLAS-MCNC: 3.6 G/DL (ref 2.8–4.4)
GLUCOSE BLD-MCNC: 95 MG/DL (ref 70–99)
HCT VFR BLD AUTO: 38.1 % (ref 35–48)
HGB BLD-MCNC: 12.3 G/DL (ref 12–16)
IMM GRANULOCYTES # BLD AUTO: 0.04 X10(3) UL (ref 0–1)
IMM GRANULOCYTES NFR BLD: 0.4 %
LYMPHOCYTES # BLD AUTO: 1.7 X10(3) UL (ref 1–4)
LYMPHOCYTES NFR BLD AUTO: 17.2 %
M PROTEIN MFR SERPL ELPH: 7.4 G/DL (ref 6.4–8.2)
MCH RBC QN AUTO: 32.1 PG (ref 26–34)
MCHC RBC AUTO-ENTMCNC: 32.3 G/DL (ref 31–37)
MCV RBC AUTO: 99.5 FL (ref 80–100)
MONOCYTES # BLD AUTO: 0.71 X10(3) UL (ref 0.1–1)
MONOCYTES NFR BLD AUTO: 7.2 %
MULTISTIX LOT#: ABNORMAL NUMERIC
NEUTROPHILS # BLD AUTO: 7.39 X10 (3) UL (ref 1.5–7.7)
NEUTROPHILS # BLD AUTO: 7.39 X10(3) UL (ref 1.5–7.7)
NEUTROPHILS NFR BLD AUTO: 74.5 %
NT-PROBNP SERPL-MCNC: 49 PG/ML (ref ?–125)
OSMOLALITY SERPL CALC.SUM OF ELEC: 281 MOSM/KG (ref 275–295)
PH, URINE: 8.5 (ref 4.5–8)
PLATELET # BLD AUTO: 415 10(3)UL (ref 150–450)
POTASSIUM SERPL-SCNC: 4.4 MMOL/L (ref 3.5–5.1)
PROTEIN (URINE DIPSTICK): 100 MG/DL
RBC # BLD AUTO: 3.83 X10(6)UL (ref 3.8–5.3)
SODIUM SERPL-SCNC: 135 MMOL/L (ref 136–145)
SPECIFIC GRAVITY: 1.02 (ref 1–1.03)
URINE-COLOR: YELLOW
UROBILINOGEN,SEMI-QN: 0.2 MG/DL (ref 0–1.9)
WBC # BLD AUTO: 9.9 X10(3) UL (ref 4–11)

## 2019-09-19 PROCEDURE — 87086 URINE CULTURE/COLONY COUNT: CPT | Performed by: INTERNAL MEDICINE

## 2019-09-19 PROCEDURE — 99214 OFFICE O/P EST MOD 30 MIN: CPT | Performed by: INTERNAL MEDICINE

## 2019-09-19 PROCEDURE — 87088 URINE BACTERIA CULTURE: CPT | Performed by: INTERNAL MEDICINE

## 2019-09-19 PROCEDURE — 85025 COMPLETE CBC W/AUTO DIFF WBC: CPT | Performed by: INTERNAL MEDICINE

## 2019-09-19 PROCEDURE — 81003 URINALYSIS AUTO W/O SCOPE: CPT | Performed by: INTERNAL MEDICINE

## 2019-09-19 PROCEDURE — 83880 ASSAY OF NATRIURETIC PEPTIDE: CPT | Performed by: INTERNAL MEDICINE

## 2019-09-19 PROCEDURE — 80053 COMPREHEN METABOLIC PANEL: CPT | Performed by: INTERNAL MEDICINE

## 2019-09-19 PROCEDURE — 99000 SPECIMEN HANDLING OFFICE-LAB: CPT | Performed by: INTERNAL MEDICINE

## 2019-09-19 PROCEDURE — 87186 SC STD MICRODIL/AGAR DIL: CPT | Performed by: INTERNAL MEDICINE

## 2019-09-19 PROCEDURE — 36415 COLL VENOUS BLD VENIPUNCTURE: CPT | Performed by: INTERNAL MEDICINE

## 2019-09-19 PROCEDURE — 85379 FIBRIN DEGRADATION QUANT: CPT | Performed by: INTERNAL MEDICINE

## 2019-09-19 RX ORDER — NITROFURANTOIN 25; 75 MG/1; MG/1
100 CAPSULE ORAL 2 TIMES DAILY
Qty: 14 CAPSULE | Refills: 0 | Status: SHIPPED | OUTPATIENT
Start: 2019-09-19 | End: 2019-09-26

## 2019-09-19 NOTE — PROGRESS NOTES
Established Patient Progress Note  Chief Complaint:   Patient presents with:  UTI: burning when urinating, dark and cloudy, urinary frequency since yesterday      HPI:   This is a 68year old female presenting with complaints of dysuria, pyuria, urinary ur Past Surgical History, Family History and   Social History updated shows  Social History    Tobacco Use      Smoking status: Current Some Day Smoker        Packs/day: 0.50      Smokeless tobacco: Never Used    Alcohol use: No    Drug use: No    Allergies: No induration, no tenderness. ASSESSMENT AND PLAN:   Urinary tract infection with hematuria, site unspecified  (primary encounter diagnosis)  Fatigue, unspecified type  Dyspnea on exertion    Patient has signs and symptoms of urinary tract infection.

## 2019-09-25 ENCOUNTER — MED REC SCAN ONLY (OUTPATIENT)
Dept: INTERNAL MEDICINE CLINIC | Facility: CLINIC | Age: 74
End: 2019-09-25

## 2019-10-01 ENCOUNTER — TELEPHONE (OUTPATIENT)
Dept: INTERNAL MEDICINE CLINIC | Facility: CLINIC | Age: 74
End: 2019-10-01

## 2019-10-01 NOTE — TELEPHONE ENCOUNTER
Incoming (mail or fax):  Fax  Received from: [facility]   Documentation given to: Triage    *Physician Signature Requested

## 2019-10-01 NOTE — TELEPHONE ENCOUNTER
Received home health certification and plan of care from [facility] for home health skilled nursing. Noted Dr. Rosendo Kirk signed admission orders for patient on 9/9/19 after hospitalization for fall, ankle fracture, and surgical repair.  Forms placed for Dr. Kalli Owens

## 2019-10-03 NOTE — TELEPHONE ENCOUNTER
Signed forms faxed back to [facility] home care. Fax confirmation received. Placed for scanning.

## 2019-10-19 DIAGNOSIS — E78.2 MIXED HYPERLIPIDEMIA: ICD-10-CM

## 2019-10-19 DIAGNOSIS — F51.04 PSYCHOPHYSIOLOGICAL INSOMNIA: ICD-10-CM

## 2019-10-19 DIAGNOSIS — R32 URINARY INCONTINENCE, UNSPECIFIED TYPE: ICD-10-CM

## 2019-10-21 NOTE — TELEPHONE ENCOUNTER
Patient called, she wanted to make sure we got the refill. atorvastatin (LIPITOR) 10 MG Oral Tab, she wants to get added as well as she need a refill. [facility] in [place] [address].

## 2019-10-22 RX ORDER — ATORVASTATIN CALCIUM 10 MG/1
10 TABLET, FILM COATED ORAL NIGHTLY
Qty: 90 TABLET | Refills: 1 | Status: SHIPPED | OUTPATIENT
Start: 2019-10-22 | End: 2020-05-26

## 2019-10-22 RX ORDER — OXYBUTYNIN CHLORIDE 5 MG/1
TABLET ORAL
Qty: 270 TABLET | Refills: 0 | OUTPATIENT
Start: 2019-10-22

## 2019-10-22 RX ORDER — TRAZODONE HYDROCHLORIDE 100 MG/1
TABLET ORAL
Qty: 90 TABLET | Refills: 1 | Status: SHIPPED | OUTPATIENT
Start: 2019-10-22 | End: 2020-02-19

## 2019-10-22 RX ORDER — OXYBUTYNIN CHLORIDE 5 MG/1
5 TABLET ORAL 3 TIMES DAILY
Qty: 270 TABLET | Refills: 0 | Status: SHIPPED | OUTPATIENT
Start: 2019-10-22 | End: 2020-01-22

## 2019-10-22 NOTE — TELEPHONE ENCOUNTER
Last OV relevant to medication: 4/22/19 PE (pt was seen more recent 9/19/19 for UTI issues)  Last refill date: 4/22/19, 7/30/19     #/refills: 90/1, 270/0  When pt was asked to return for OV: 6 months chronic issues   Upcoming appt/reason: 10/23/19 chronic

## 2019-10-23 ENCOUNTER — OFFICE VISIT (OUTPATIENT)
Dept: INTERNAL MEDICINE CLINIC | Facility: CLINIC | Age: 74
End: 2019-10-23
Payer: COMMERCIAL

## 2019-10-23 VITALS
OXYGEN SATURATION: 96 % | DIASTOLIC BLOOD PRESSURE: 72 MMHG | HEIGHT: 62 IN | BODY MASS INDEX: 23.89 KG/M2 | TEMPERATURE: 98 F | WEIGHT: 129.81 LBS | SYSTOLIC BLOOD PRESSURE: 126 MMHG | RESPIRATION RATE: 14 BRPM | HEART RATE: 85 BPM

## 2019-10-23 DIAGNOSIS — R30.9 URINARY PAIN: Primary | ICD-10-CM

## 2019-10-23 DIAGNOSIS — F32.9 MAJOR DEPRESSIVE DISORDER, REMISSION STATUS UNSPECIFIED, UNSPECIFIED WHETHER RECURRENT: ICD-10-CM

## 2019-10-23 DIAGNOSIS — Z23 NEED FOR VACCINATION: ICD-10-CM

## 2019-10-23 DIAGNOSIS — M81.0 AGE-RELATED OSTEOPOROSIS WITHOUT CURRENT PATHOLOGICAL FRACTURE: ICD-10-CM

## 2019-10-23 DIAGNOSIS — S82.851D CLOSED TRIMALLEOLAR FRACTURE OF RIGHT ANKLE WITH ROUTINE HEALING, SUBSEQUENT ENCOUNTER: ICD-10-CM

## 2019-10-23 PROBLEM — S82.851A CLOSED RIGHT TRIMALLEOLAR FRACTURE: Status: ACTIVE | Noted: 2019-08-30

## 2019-10-23 PROCEDURE — 87077 CULTURE AEROBIC IDENTIFY: CPT | Performed by: STUDENT IN AN ORGANIZED HEALTH CARE EDUCATION/TRAINING PROGRAM

## 2019-10-23 PROCEDURE — 87086 URINE CULTURE/COLONY COUNT: CPT | Performed by: STUDENT IN AN ORGANIZED HEALTH CARE EDUCATION/TRAINING PROGRAM

## 2019-10-23 PROCEDURE — 90662 IIV NO PRSV INCREASED AG IM: CPT | Performed by: STUDENT IN AN ORGANIZED HEALTH CARE EDUCATION/TRAINING PROGRAM

## 2019-10-23 PROCEDURE — 99214 OFFICE O/P EST MOD 30 MIN: CPT | Performed by: STUDENT IN AN ORGANIZED HEALTH CARE EDUCATION/TRAINING PROGRAM

## 2019-10-23 PROCEDURE — 90471 IMMUNIZATION ADMIN: CPT | Performed by: STUDENT IN AN ORGANIZED HEALTH CARE EDUCATION/TRAINING PROGRAM

## 2019-10-23 PROCEDURE — 81003 URINALYSIS AUTO W/O SCOPE: CPT | Performed by: STUDENT IN AN ORGANIZED HEALTH CARE EDUCATION/TRAINING PROGRAM

## 2019-10-23 PROCEDURE — 87186 SC STD MICRODIL/AGAR DIL: CPT | Performed by: STUDENT IN AN ORGANIZED HEALTH CARE EDUCATION/TRAINING PROGRAM

## 2019-10-23 RX ORDER — SERTRALINE HYDROCHLORIDE 100 MG/1
200 TABLET, FILM COATED ORAL DAILY
Qty: 180 TABLET | Refills: 1 | Status: CANCELLED | OUTPATIENT
Start: 2019-10-23

## 2019-10-23 RX ORDER — RISEDRONATE SODIUM 150 MG/1
150 TABLET, FILM COATED ORAL
Qty: 12 TABLET | Refills: 0 | Status: SHIPPED | OUTPATIENT
Start: 2019-10-23 | End: 2021-01-22 | Stop reason: ALTCHOICE

## 2019-10-23 RX ORDER — SERTRALINE HYDROCHLORIDE 100 MG/1
200 TABLET, FILM COATED ORAL DAILY
Qty: 180 TABLET | Refills: 1 | Status: SHIPPED | OUTPATIENT
Start: 2019-10-23 | End: 2020-05-01

## 2019-10-23 NOTE — PROGRESS NOTES
[facility]    REASON FOR VISIT:    Current Complaints: Patient presents with: Follow - Up: still having some uti symptoms, sometimes she has pain      HPI:  Naye Oconnor is a 68year old female who presents for a f/u visit.     Has been seen i 90 tablet, Rfl: 1  atorvastatin (LIPITOR) 10 MG Oral Tab, Take 1 tablet (10 mg total) by mouth nightly., Disp: 90 tablet, Rfl: 1  Oxybutynin Chloride 5 MG Oral Tab, Take 1 tablet (5 mg total) by mouth 3 (three) times daily. , Disp: 270 tablet, Rfl: 0  Diclo rate, regular rhythm and normal heart sounds. Exam reveals no friction rub. No murmur heard. Pulmonary/Chest: Effort normal and breath sounds normal throughout lung fields. No wheezes or rales appreciated. Abdominal: Soft.  Bowel sounds are normal in week d/w the pt. Handouts given. Urged the patient to quit smoking.    -     Risedronate Sodium 150 MG Oral Tab; Take 1 tablet (150 mg total) by mouth every 30 (thirty) days.     Closed trimalleolar fracture of right ankle with routine healing, subsequent

## 2019-11-04 ENCOUNTER — TELEPHONE (OUTPATIENT)
Dept: INTERNAL MEDICINE CLINIC | Facility: CLINIC | Age: 74
End: 2019-11-04

## 2019-11-04 NOTE — TELEPHONE ENCOUNTER
Received Discharge order from [facility]  To Dr. Rosendo Kirk for review & signature  To fax back to [phone]

## 2019-11-05 NOTE — TELEPHONE ENCOUNTER
Faxed back to Charlotte Solis at [facility]  Fax # [phone]  Confirmed fax sent. Sent to scanning.

## 2019-11-26 ENCOUNTER — TELEPHONE (OUTPATIENT)
Dept: INTERNAL MEDICINE CLINIC | Facility: CLINIC | Age: 74
End: 2019-11-26

## 2019-11-26 NOTE — TELEPHONE ENCOUNTER
Incoming (mail or fax):  Fax  Received from: [facility]   Documentation given to: Triage    *Physician Signature Requested

## 2019-11-26 NOTE — TELEPHONE ENCOUNTER
Received PT Evaluation from [facility]  To Dr. Chris Benitez for review & signature    To fax back to [phone]  Then to scanning

## 2019-11-27 NOTE — TELEPHONE ENCOUNTER
Signed evaluation faxed to [facility]. Fax confirmation received, evaluation placed for scanning.

## 2020-01-21 DIAGNOSIS — R32 URINARY INCONTINENCE, UNSPECIFIED TYPE: ICD-10-CM

## 2020-01-22 RX ORDER — OXYBUTYNIN CHLORIDE 5 MG/1
TABLET ORAL
Qty: 270 TABLET | Refills: 0 | Status: SHIPPED | OUTPATIENT
Start: 2020-01-22 | End: 2020-05-01

## 2020-01-22 NOTE — TELEPHONE ENCOUNTER
Last OV relevant to medication: 10/23/19  Last refill date: 10/22/19     #/refills: 270/0  When pt was asked to return for OV: 3 days if not better; PE due 4/22/2020 per   Upcoming appt/reason: none

## 2020-02-18 ENCOUNTER — TELEPHONE (OUTPATIENT)
Dept: INTERNAL MEDICINE CLINIC | Facility: CLINIC | Age: 75
End: 2020-02-18

## 2020-02-18 NOTE — TELEPHONE ENCOUNTER
Patient stated that she is concerned that she may have an ear infection. Appointment scheduled for tomorrow 02/19/20 at 1:30p.m. with Dr. Darlyn Lenz. Patient requested a call back from RN to further discuss symptoms . Please call back [phone].

## 2020-02-19 DIAGNOSIS — M81.0 AGE-RELATED OSTEOPOROSIS WITHOUT CURRENT PATHOLOGICAL FRACTURE: ICD-10-CM

## 2020-02-19 DIAGNOSIS — F51.04 PSYCHOPHYSIOLOGICAL INSOMNIA: ICD-10-CM

## 2020-02-19 DIAGNOSIS — G89.29 CHRONIC RIGHT SHOULDER PAIN: ICD-10-CM

## 2020-02-19 DIAGNOSIS — F32.9 MAJOR DEPRESSIVE DISORDER, REMISSION STATUS UNSPECIFIED, UNSPECIFIED WHETHER RECURRENT: ICD-10-CM

## 2020-02-19 DIAGNOSIS — R32 URINARY INCONTINENCE, UNSPECIFIED TYPE: ICD-10-CM

## 2020-02-19 DIAGNOSIS — M25.511 CHRONIC RIGHT SHOULDER PAIN: ICD-10-CM

## 2020-02-19 DIAGNOSIS — E78.2 MIXED HYPERLIPIDEMIA: ICD-10-CM

## 2020-02-19 NOTE — TELEPHONE ENCOUNTER
Patient requesting refill of the following medications:    OXYBUTYNIN CHLORIDE 5 MG Oral Tab    Sertraline HCl 100 MG Oral Tab    Risedronate Sodium 150 MG Oral Tab     TRAZODONE  MG Oral Tab    atorvastatin (LIPITOR) 10 MG Oral Tab     Diclofenac S

## 2020-02-20 RX ORDER — TRAZODONE HYDROCHLORIDE 100 MG/1
TABLET ORAL
Qty: 90 TABLET | Refills: 0 | Status: SHIPPED | OUTPATIENT
Start: 2020-02-20 | End: 2020-05-01

## 2020-02-20 RX ORDER — RISEDRONATE SODIUM 150 MG/1
150 TABLET, FILM COATED ORAL
Qty: 12 TABLET | Refills: 0 | OUTPATIENT
Start: 2020-02-20

## 2020-02-20 RX ORDER — SERTRALINE HYDROCHLORIDE 100 MG/1
200 TABLET, FILM COATED ORAL DAILY
Qty: 180 TABLET | Refills: 1 | OUTPATIENT
Start: 2020-02-20

## 2020-02-20 RX ORDER — OXYBUTYNIN CHLORIDE 5 MG/1
5 TABLET ORAL 3 TIMES DAILY
Qty: 270 TABLET | Refills: 0 | OUTPATIENT
Start: 2020-02-20

## 2020-02-20 RX ORDER — ATORVASTATIN CALCIUM 10 MG/1
10 TABLET, FILM COATED ORAL NIGHTLY
Qty: 90 TABLET | Refills: 1 | OUTPATIENT
Start: 2020-02-20

## 2020-02-20 NOTE — TELEPHONE ENCOUNTER
Pt should not need refills. Spoke with Benny Springer at [facility]. Pt has refills on every medication request except for the trazadone ( rx perhaps?) asked that [place] refill meds for pt.      Trazodone pended as 90ds to be consistent with her other me

## 2020-02-21 NOTE — TELEPHONE ENCOUNTER
Pt cancelled OV. Left message to discuss symptoms, advised pt to call back. Will update if she does.

## 2020-04-06 ENCOUNTER — TELEPHONE (OUTPATIENT)
Dept: INTERNAL MEDICINE CLINIC | Facility: CLINIC | Age: 75
End: 2020-04-06

## 2020-04-06 DIAGNOSIS — R39.9 UTI SYMPTOMS: Primary | ICD-10-CM

## 2020-04-06 NOTE — TELEPHONE ENCOUNTER
Pt states that she has a UTI and would like antibiotics for it. She states that she has had it for about a week. States that it is not too bad but is really burning. Please Advise.     Thank you

## 2020-04-07 PROCEDURE — 99214 OFFICE O/P EST MOD 30 MIN: CPT | Performed by: NURSE PRACTITIONER

## 2020-04-07 RX ORDER — SULFAMETHOXAZOLE AND TRIMETHOPRIM 800; 160 MG/1; MG/1
1 TABLET ORAL 2 TIMES DAILY
Qty: 6 TABLET | Refills: 0 | Status: SHIPPED | OUTPATIENT
Start: 2020-04-07 | End: 2020-04-10

## 2020-04-07 NOTE — TELEPHONE ENCOUNTER
Spoke with pt. Virtual/Telephone Check-In    [place] verbally consents to a Virtual/Telephone Check-In service on 4/7/20.   Patient understands and accepts financial responsibility for any deductible, co-insurance and/or co-pays associated with t

## 2020-04-07 NOTE — TELEPHONE ENCOUNTER
Virtual/Telephone Check-In    See consent for telephone visit in noted below. Duration of the service: 6 mins    Summary of topics discussed:  UTI symptoms.     Chief complaint: dysuria, urgency    HPI: The patient reports that she has had dysuria and ur previous OV noted. Bactrim for 3 days sent to the patients pharmacy. She was instructed to take it with food, eat yogurt twice a day while on it and monitor for diarrhea. If her symptoms do not resolved with the abt she is to notify us.  She may need an ext

## 2020-04-13 ENCOUNTER — TELEPHONE (OUTPATIENT)
Dept: INTERNAL MEDICINE CLINIC | Facility: CLINIC | Age: 75
End: 2020-04-13

## 2020-04-13 NOTE — TELEPHONE ENCOUNTER
The patient was called to check on her symptoms. She reports the dysuria and urgency are completely resolved. She took the abt completely as directed and reports no side effects. She was instructed to call us if her symptoms return.      Call was < 5 mins a

## 2020-04-30 DIAGNOSIS — R32 URINARY INCONTINENCE, UNSPECIFIED TYPE: ICD-10-CM

## 2020-05-01 DIAGNOSIS — F32.9 MAJOR DEPRESSIVE DISORDER, REMISSION STATUS UNSPECIFIED, UNSPECIFIED WHETHER RECURRENT: ICD-10-CM

## 2020-05-01 DIAGNOSIS — F51.04 PSYCHOPHYSIOLOGICAL INSOMNIA: ICD-10-CM

## 2020-05-01 RX ORDER — TRAZODONE HYDROCHLORIDE 100 MG/1
TABLET ORAL
Qty: 90 TABLET | Refills: 0 | Status: SHIPPED | OUTPATIENT
Start: 2020-05-01 | End: 2020-06-24

## 2020-05-01 RX ORDER — OXYBUTYNIN CHLORIDE 5 MG/1
TABLET ORAL
Qty: 270 TABLET | Refills: 0 | Status: SHIPPED | OUTPATIENT
Start: 2020-05-01 | End: 2020-06-15

## 2020-05-01 RX ORDER — SERTRALINE HYDROCHLORIDE 100 MG/1
200 TABLET, FILM COATED ORAL DAILY
Qty: 180 TABLET | Refills: 0 | Status: SHIPPED | OUTPATIENT
Start: 2020-05-01 | End: 2020-07-24

## 2020-05-01 NOTE — TELEPHONE ENCOUNTER
Last OV relevant to medication: 10/23/19  Last refill date: 10/23/19 Sertraline #180. 1RF                          2/20/2020 Trazodone #90.0RF  When pt was asked to return for OV: No appt scheduled. Upcoming appt/reason: No appt scheduled.

## 2020-05-01 NOTE — TELEPHONE ENCOUNTER
Did the patient contact the pharmacy directly?:  yes    Is patient out of meds or supply very low?:      Medication Requested:  traZODone HCl 100 MG Oral Tab        Is patient requesting a 30 or 90 day supply?:  90 day    Pharmacy name and phone # or locat

## 2020-05-01 NOTE — TELEPHONE ENCOUNTER
Last OV relevant to medication: 10/23/2019  Last refill date: 1/22/2020     #/refills: 270/0  When pt was asked to return for OV: PRN  Upcoming appt/reason: None

## 2020-05-24 DIAGNOSIS — E78.2 MIXED HYPERLIPIDEMIA: ICD-10-CM

## 2020-05-26 RX ORDER — ATORVASTATIN CALCIUM 10 MG/1
TABLET, FILM COATED ORAL
Qty: 90 TABLET | Refills: 0 | Status: SHIPPED | OUTPATIENT
Start: 2020-05-26 | End: 2020-06-15

## 2020-06-14 DIAGNOSIS — R32 URINARY INCONTINENCE, UNSPECIFIED TYPE: ICD-10-CM

## 2020-06-14 DIAGNOSIS — E78.2 MIXED HYPERLIPIDEMIA: ICD-10-CM

## 2020-06-14 DIAGNOSIS — F51.04 PSYCHOPHYSIOLOGICAL INSOMNIA: ICD-10-CM

## 2020-06-15 DIAGNOSIS — F32.9 MAJOR DEPRESSIVE DISORDER, REMISSION STATUS UNSPECIFIED, UNSPECIFIED WHETHER RECURRENT: ICD-10-CM

## 2020-06-15 DIAGNOSIS — R32 URINARY INCONTINENCE, UNSPECIFIED TYPE: ICD-10-CM

## 2020-06-15 DIAGNOSIS — F51.04 PSYCHOPHYSIOLOGICAL INSOMNIA: ICD-10-CM

## 2020-06-15 DIAGNOSIS — E78.2 MIXED HYPERLIPIDEMIA: ICD-10-CM

## 2020-06-15 RX ORDER — OXYBUTYNIN CHLORIDE 5 MG/1
5 TABLET ORAL 3 TIMES DAILY
Qty: 90 TABLET | Refills: 0 | OUTPATIENT
Start: 2020-06-15

## 2020-06-15 RX ORDER — SERTRALINE HYDROCHLORIDE 100 MG/1
200 TABLET, FILM COATED ORAL DAILY
Qty: 60 TABLET | Refills: 0 | OUTPATIENT
Start: 2020-06-15

## 2020-06-15 RX ORDER — ATORVASTATIN CALCIUM 10 MG/1
10 TABLET, FILM COATED ORAL NIGHTLY
Qty: 30 TABLET | Refills: 0 | Status: SHIPPED | OUTPATIENT
Start: 2020-06-15 | End: 2020-07-24

## 2020-06-15 RX ORDER — OXYBUTYNIN CHLORIDE 5 MG/1
5 TABLET ORAL 3 TIMES DAILY
Qty: 90 TABLET | Refills: 0 | Status: SHIPPED | OUTPATIENT
Start: 2020-06-15 | End: 2020-07-24

## 2020-06-15 RX ORDER — ATORVASTATIN CALCIUM 10 MG/1
10 TABLET, FILM COATED ORAL NIGHTLY
Qty: 30 TABLET | Refills: 0 | OUTPATIENT
Start: 2020-06-15

## 2020-06-15 RX ORDER — TRAZODONE HYDROCHLORIDE 100 MG/1
TABLET ORAL
Qty: 90 TABLET | Refills: 0 | OUTPATIENT
Start: 2020-06-15

## 2020-06-15 NOTE — TELEPHONE ENCOUNTER
Did the patient contact the pharmacy directly?:  Yes - told to contact the office    Is patient out of meds or supply very low?:  Out of sertaline and very low on others    Medication and Dose Requested:  atorvastatin 10 MG Oral Tab

## 2020-06-15 NOTE — TELEPHONE ENCOUNTER
Spoke to Pharmacist at [facility] and confirmed that patient does not need refills at this time on any of her meds. Patient to be notified of this.

## 2020-06-24 DIAGNOSIS — F51.04 PSYCHOPHYSIOLOGICAL INSOMNIA: ICD-10-CM

## 2020-06-24 RX ORDER — TRAZODONE HYDROCHLORIDE 100 MG/1
TABLET ORAL
Qty: 90 TABLET | Refills: 0 | Status: SHIPPED | OUTPATIENT
Start: 2020-06-24 | End: 2020-07-24

## 2020-06-24 NOTE — TELEPHONE ENCOUNTER
Last OV relevant to medication:4/6/2020 TE                                                 4/22/19-PE  Last refill date: 5/1/2020     #90/refills: 0  When pt was asked to return for OV: 6 months from 4/22 PE  Upcoming appt/reason: 7/24/2020 SV  Lab Results

## 2020-07-24 ENCOUNTER — OFFICE VISIT (OUTPATIENT)
Dept: INTERNAL MEDICINE CLINIC | Facility: CLINIC | Age: 75
End: 2020-07-24
Payer: MEDICARE

## 2020-07-24 VITALS
DIASTOLIC BLOOD PRESSURE: 80 MMHG | HEIGHT: 59.5 IN | WEIGHT: 131.38 LBS | SYSTOLIC BLOOD PRESSURE: 124 MMHG | TEMPERATURE: 97 F | OXYGEN SATURATION: 98 % | HEART RATE: 77 BPM | BODY MASS INDEX: 26.13 KG/M2 | RESPIRATION RATE: 16 BRPM

## 2020-07-24 DIAGNOSIS — M19.042 OSTEOARTHRITIS OF BOTH HANDS, UNSPECIFIED OSTEOARTHRITIS TYPE: ICD-10-CM

## 2020-07-24 DIAGNOSIS — F51.04 PSYCHOPHYSIOLOGICAL INSOMNIA: ICD-10-CM

## 2020-07-24 DIAGNOSIS — N32.81 OVERACTIVE BLADDER: ICD-10-CM

## 2020-07-24 DIAGNOSIS — Z12.31 ENCOUNTER FOR SCREENING MAMMOGRAM FOR BREAST CANCER: ICD-10-CM

## 2020-07-24 DIAGNOSIS — Z11.59 NEED FOR HEPATITIS C SCREENING TEST: ICD-10-CM

## 2020-07-24 DIAGNOSIS — M19.041 OSTEOARTHRITIS OF BOTH HANDS, UNSPECIFIED OSTEOARTHRITIS TYPE: ICD-10-CM

## 2020-07-24 DIAGNOSIS — Z79.899 ENCOUNTER FOR LONG-TERM (CURRENT) USE OF MEDICATIONS: ICD-10-CM

## 2020-07-24 DIAGNOSIS — E55.9 VITAMIN D DEFICIENCY: ICD-10-CM

## 2020-07-24 DIAGNOSIS — G89.29 CHRONIC BILATERAL THORACIC BACK PAIN: ICD-10-CM

## 2020-07-24 DIAGNOSIS — Z00.00 ENCOUNTER FOR ANNUAL HEALTH EXAMINATION: Primary | ICD-10-CM

## 2020-07-24 DIAGNOSIS — M54.6 CHRONIC BILATERAL THORACIC BACK PAIN: ICD-10-CM

## 2020-07-24 DIAGNOSIS — M81.0 AGE-RELATED OSTEOPOROSIS WITHOUT CURRENT PATHOLOGICAL FRACTURE: ICD-10-CM

## 2020-07-24 DIAGNOSIS — F32.9 MAJOR DEPRESSIVE DISORDER, REMISSION STATUS UNSPECIFIED, UNSPECIFIED WHETHER RECURRENT: ICD-10-CM

## 2020-07-24 DIAGNOSIS — F17.200 TOBACCO USE DISORDER: ICD-10-CM

## 2020-07-24 DIAGNOSIS — R32 URINARY INCONTINENCE, UNSPECIFIED TYPE: ICD-10-CM

## 2020-07-24 DIAGNOSIS — E78.2 MIXED HYPERLIPIDEMIA: ICD-10-CM

## 2020-07-24 DIAGNOSIS — R30.0 DYSURIA: ICD-10-CM

## 2020-07-24 DIAGNOSIS — M43.17 SPONDYLOLISTHESIS AT L5-S1 LEVEL: ICD-10-CM

## 2020-07-24 PROBLEM — S82.851A CLOSED RIGHT TRIMALLEOLAR FRACTURE: Status: RESOLVED | Noted: 2019-08-30 | Resolved: 2020-07-24

## 2020-07-24 LAB
APPEARANCE: CLEAR
BILIRUBIN: NEGATIVE
GLUCOSE (URINE DIPSTICK): NEGATIVE MG/DL
KETONES (URINE DIPSTICK): NEGATIVE MG/DL
MULTISTIX LOT#: 1044 NUMERIC
NITRITE, URINE: NEGATIVE
PH, URINE: 7.5 (ref 4.5–8)
SPECIFIC GRAVITY: 1.02 (ref 1–1.03)
URINE-COLOR: YELLOW
UROBILINOGEN,SEMI-QN: 0.2 MG/DL (ref 0–1.9)

## 2020-07-24 PROCEDURE — 99397 PER PM REEVAL EST PAT 65+ YR: CPT | Performed by: STUDENT IN AN ORGANIZED HEALTH CARE EDUCATION/TRAINING PROGRAM

## 2020-07-24 PROCEDURE — 81003 URINALYSIS AUTO W/O SCOPE: CPT | Performed by: STUDENT IN AN ORGANIZED HEALTH CARE EDUCATION/TRAINING PROGRAM

## 2020-07-24 PROCEDURE — 3008F BODY MASS INDEX DOCD: CPT | Performed by: STUDENT IN AN ORGANIZED HEALTH CARE EDUCATION/TRAINING PROGRAM

## 2020-07-24 PROCEDURE — G0439 PPPS, SUBSEQ VISIT: HCPCS | Performed by: STUDENT IN AN ORGANIZED HEALTH CARE EDUCATION/TRAINING PROGRAM

## 2020-07-24 PROCEDURE — 87086 URINE CULTURE/COLONY COUNT: CPT | Performed by: STUDENT IN AN ORGANIZED HEALTH CARE EDUCATION/TRAINING PROGRAM

## 2020-07-24 PROCEDURE — 3079F DIAST BP 80-89 MM HG: CPT | Performed by: STUDENT IN AN ORGANIZED HEALTH CARE EDUCATION/TRAINING PROGRAM

## 2020-07-24 PROCEDURE — 3074F SYST BP LT 130 MM HG: CPT | Performed by: STUDENT IN AN ORGANIZED HEALTH CARE EDUCATION/TRAINING PROGRAM

## 2020-07-24 PROCEDURE — 96160 PT-FOCUSED HLTH RISK ASSMT: CPT | Performed by: STUDENT IN AN ORGANIZED HEALTH CARE EDUCATION/TRAINING PROGRAM

## 2020-07-24 RX ORDER — NITROFURANTOIN 25; 75 MG/1; MG/1
100 CAPSULE ORAL 2 TIMES DAILY
Qty: 10 CAPSULE | Refills: 0 | Status: SHIPPED | OUTPATIENT
Start: 2020-07-24 | End: 2020-10-19

## 2020-07-24 RX ORDER — TRAZODONE HYDROCHLORIDE 100 MG/1
100 TABLET ORAL EVERY EVENING
Qty: 90 TABLET | Refills: 1 | Status: SHIPPED | OUTPATIENT
Start: 2020-07-24 | End: 2021-01-12

## 2020-07-24 RX ORDER — OXYBUTYNIN CHLORIDE 5 MG/1
5 TABLET ORAL 3 TIMES DAILY
Qty: 90 TABLET | Refills: 1 | Status: SHIPPED | OUTPATIENT
Start: 2020-07-24 | End: 2020-08-19

## 2020-07-24 RX ORDER — SERTRALINE HYDROCHLORIDE 100 MG/1
200 TABLET, FILM COATED ORAL DAILY
Qty: 180 TABLET | Refills: 1 | Status: SHIPPED | OUTPATIENT
Start: 2020-07-24 | End: 2021-01-12

## 2020-07-24 RX ORDER — ATORVASTATIN CALCIUM 10 MG/1
10 TABLET, FILM COATED ORAL NIGHTLY
Qty: 90 TABLET | Refills: 1 | Status: SHIPPED | OUTPATIENT
Start: 2020-07-24 | End: 2021-01-12

## 2020-07-24 NOTE — PROGRESS NOTES
HPI:   Gael Rivera is a 69year old female who presents for a MA (Medicare Advantage) Supervisit (Once per calendar year).       Her last annual assessment has been over 1 year: Annual Physical due on 04/22/2020      HPI:   Patient reports dysuria for Current Some Day Smoker        Packs/day: 0.50      Smokeless tobacco: Never Used     This is a tobacco user, and I gave tobacco cessation counseling today.            CAGE Alcohol screening   Bhumi Arvizu was screened for Alcohol abuse and had a score o Sodium 1 % Transdermal Gel, Apply 2 g topically 3 (three) times daily. MEDICAL INFORMATION:   She  has a past medical history of Depression and Other and unspecified hyperlipidemia. She  has no past surgical history on file.     Her family history Tolerate Visual Acuity: Yes      General Appearance:  Alert, cooperative, no distress, appears stated age   Head:  Normocephalic, without obvious abnormality, atraumatic   Eyes:  PERRL, conjunctiva/corneas clear, EOM's intact both eyes   Ears:  Normal TM's MG Oral Tab; Take 1 tablet (10 mg total) by mouth nightly. Appt Due For Further Refills    Tobacco use disorder  -     CT LUNG LD SCREENING(CPT=); Future    Psychophysiological insomnia  -     traZODone HCl 100 MG Oral Tab;  Take 1 tablet (100 mg total Diet assessment: good     PLAN:  Tobacco use disorder: Urged the patient to quit smoking. Risk of smoking such as cancer, heart attack and other cardiovascular complications relating details.   Patient verbalized understanding, but not ready to quit, s SCHEDULE Internal Lab or Procedure External Lab or Procedure   Diabetes Screening      HbgA1C   Annually Lab Results   Component Value Date    A1C 5.7 (H) 08/16/2019    No flowsheet data found.     Fasting Blood Sugar (FSB)Annually Glucose (mg/dL)   Date Va Moderate/High Risk No vaccine history found Medium/high risk factors:   End-stage renal disease   Hemophiliacs who received Factor VIII or IX concentrates   Clients of institutions for the mentally retarded   Persons who live in the same house as a HepB vi

## 2020-07-24 NOTE — PATIENT INSTRUCTIONS
Leola Serrano's SCREENING SCHEDULE   Tests on this list are recommended by your physician but may not be covered, or covered at this frequency, by your insurer. Please check with your insurance carrier before scheduling to verify coverage.    PREVENTATI Covered every 10 years- more often if abnormal Colonoscopy due on 02/22/2026 Update Health Maintenance if applicable    Flex Sigmoidoscopy Screen  Covered every 5 years No results found for this or any previous visit. No flowsheet data found.      Fecal O 13 JAMAAL IM    Please get once after your 65th birthday    Pneumococcal 23 (Pneumovax)  Covered Once after 65 No orders found for this or any previous visit.  Please get once after your 65th birthday    Hepatitis B for Moderate/High Risk       No orders found

## 2020-07-27 DIAGNOSIS — R32 URINARY INCONTINENCE, UNSPECIFIED TYPE: ICD-10-CM

## 2020-07-29 RX ORDER — OXYBUTYNIN CHLORIDE 5 MG/1
TABLET ORAL
Qty: 270 TABLET | Refills: 0 | OUTPATIENT
Start: 2020-07-29

## 2020-07-29 NOTE — PROGRESS NOTES
Spoke to pt, aware of results. Pt voiced understanding. Pt states when she urinates, it still \"a little bit burns\". Pt states has been taking Macrobid BID. Advised to complete full course & to drink plenty of water.   Pt denies any abdominal pain or fe

## 2020-07-30 RX ORDER — PHENAZOPYRIDINE HYDROCHLORIDE 200 MG/1
TABLET, FILM COATED ORAL
Qty: 15 TABLET | Refills: 0 | Status: SHIPPED | OUTPATIENT
Start: 2020-07-30 | End: 2021-01-22 | Stop reason: ALTCHOICE

## 2020-08-04 ENCOUNTER — LABORATORY ENCOUNTER (OUTPATIENT)
Dept: LAB | Age: 75
End: 2020-08-04
Attending: STUDENT IN AN ORGANIZED HEALTH CARE EDUCATION/TRAINING PROGRAM
Payer: MEDICARE

## 2020-08-04 DIAGNOSIS — Z11.59 NEED FOR HEPATITIS C SCREENING TEST: ICD-10-CM

## 2020-08-04 DIAGNOSIS — E55.9 VITAMIN D DEFICIENCY: ICD-10-CM

## 2020-08-04 DIAGNOSIS — Z79.899 ENCOUNTER FOR LONG-TERM (CURRENT) USE OF MEDICATIONS: ICD-10-CM

## 2020-08-04 DIAGNOSIS — E78.2 MIXED HYPERLIPIDEMIA: ICD-10-CM

## 2020-08-04 LAB
ALBUMIN SERPL-MCNC: 3.9 G/DL (ref 3.4–5)
ALBUMIN/GLOB SERPL: 1.1 {RATIO} (ref 1–2)
ALP LIVER SERPL-CCNC: 113 U/L (ref 55–142)
ALT SERPL-CCNC: 14 U/L (ref 13–56)
ANION GAP SERPL CALC-SCNC: 3 MMOL/L (ref 0–18)
AST SERPL-CCNC: 10 U/L (ref 15–37)
BASOPHILS # BLD AUTO: 0.05 X10(3) UL (ref 0–0.2)
BASOPHILS NFR BLD AUTO: 1 %
BILIRUB SERPL-MCNC: 0.5 MG/DL (ref 0.1–2)
BUN BLD-MCNC: 14 MG/DL (ref 7–18)
BUN/CREAT SERPL: 18.7 (ref 10–20)
CALCIUM BLD-MCNC: 9.9 MG/DL (ref 8.5–10.1)
CHLORIDE SERPL-SCNC: 107 MMOL/L (ref 98–112)
CHOLEST SMN-MCNC: 174 MG/DL (ref ?–200)
CO2 SERPL-SCNC: 31 MMOL/L (ref 21–32)
CREAT BLD-MCNC: 0.75 MG/DL (ref 0.55–1.02)
DEPRECATED RDW RBC AUTO: 51.2 FL (ref 35.1–46.3)
EOSINOPHIL # BLD AUTO: 0.17 X10(3) UL (ref 0–0.7)
EOSINOPHIL NFR BLD AUTO: 3.3 %
ERYTHROCYTE [DISTWIDTH] IN BLOOD BY AUTOMATED COUNT: 13.7 % (ref 11–15)
GLOBULIN PLAS-MCNC: 3.4 G/DL (ref 2.8–4.4)
GLUCOSE BLD-MCNC: 102 MG/DL (ref 70–99)
HCT VFR BLD AUTO: 44.1 % (ref 35–48)
HCV AB SERPL QL IA: NONREACTIVE
HDLC SERPL-MCNC: 58 MG/DL (ref 40–59)
HGB BLD-MCNC: 13.9 G/DL (ref 12–16)
IMM GRANULOCYTES # BLD AUTO: 0.02 X10(3) UL (ref 0–1)
IMM GRANULOCYTES NFR BLD: 0.4 %
LDLC SERPL CALC-MCNC: 80 MG/DL (ref ?–100)
LYMPHOCYTES # BLD AUTO: 2.12 X10(3) UL (ref 1–4)
LYMPHOCYTES NFR BLD AUTO: 40.6 %
M PROTEIN MFR SERPL ELPH: 7.3 G/DL (ref 6.4–8.2)
MCH RBC QN AUTO: 31.8 PG (ref 26–34)
MCHC RBC AUTO-ENTMCNC: 31.5 G/DL (ref 31–37)
MCV RBC AUTO: 100.9 FL (ref 80–100)
MONOCYTES # BLD AUTO: 0.44 X10(3) UL (ref 0.1–1)
MONOCYTES NFR BLD AUTO: 8.4 %
NEUTROPHILS # BLD AUTO: 2.42 X10 (3) UL (ref 1.5–7.7)
NEUTROPHILS # BLD AUTO: 2.42 X10(3) UL (ref 1.5–7.7)
NEUTROPHILS NFR BLD AUTO: 46.3 %
NONHDLC SERPL-MCNC: 116 MG/DL (ref ?–130)
OSMOLALITY SERPL CALC.SUM OF ELEC: 293 MOSM/KG (ref 275–295)
PATIENT FASTING Y/N/NP: YES
PATIENT FASTING Y/N/NP: YES
PLATELET # BLD AUTO: 221 10(3)UL (ref 150–450)
POTASSIUM SERPL-SCNC: 4.4 MMOL/L (ref 3.5–5.1)
RBC # BLD AUTO: 4.37 X10(6)UL (ref 3.8–5.3)
SODIUM SERPL-SCNC: 141 MMOL/L (ref 136–145)
TRIGL SERPL-MCNC: 181 MG/DL (ref 30–149)
TSI SER-ACNC: 3.51 MIU/ML (ref 0.36–3.74)
VIT D+METAB SERPL-MCNC: 56.9 NG/ML (ref 30–100)
VLDLC SERPL CALC-MCNC: 36 MG/DL (ref 0–30)
WBC # BLD AUTO: 5.2 X10(3) UL (ref 4–11)

## 2020-08-04 PROCEDURE — 85025 COMPLETE CBC W/AUTO DIFF WBC: CPT

## 2020-08-04 PROCEDURE — 80061 LIPID PANEL: CPT

## 2020-08-04 PROCEDURE — 80053 COMPREHEN METABOLIC PANEL: CPT

## 2020-08-04 PROCEDURE — 36415 COLL VENOUS BLD VENIPUNCTURE: CPT

## 2020-08-04 PROCEDURE — 86803 HEPATITIS C AB TEST: CPT

## 2020-08-04 PROCEDURE — 82306 VITAMIN D 25 HYDROXY: CPT

## 2020-08-04 PROCEDURE — 84443 ASSAY THYROID STIM HORMONE: CPT

## 2020-08-06 ENCOUNTER — TELEPHONE (OUTPATIENT)
Dept: INTERNAL MEDICINE CLINIC | Facility: CLINIC | Age: 75
End: 2020-08-06

## 2020-08-06 DIAGNOSIS — N39.0 URINARY TRACT INFECTION WITH HEMATURIA, SITE UNSPECIFIED: ICD-10-CM

## 2020-08-06 DIAGNOSIS — N39.0 URINARY TRACT INFECTION WITHOUT HEMATURIA, SITE UNSPECIFIED: ICD-10-CM

## 2020-08-06 DIAGNOSIS — R31.9 URINARY TRACT INFECTION WITH HEMATURIA, SITE UNSPECIFIED: ICD-10-CM

## 2020-08-06 DIAGNOSIS — N20.0 KIDNEY STONE: Primary | ICD-10-CM

## 2020-08-06 NOTE — TELEPHONE ENCOUNTER
Spoke with pt. Starting at 1am this morning, woke with severe right sided abdominal pain, vomited d/t pain. Took ibuprofen 530 am. Severe pain started again this morning. Pt states history of kidney stone.  Advised due to severe pain, needs immediate eval a

## 2020-08-06 NOTE — TELEPHONE ENCOUNTER
Pt was in [place] ER and diagnosed with kidney stone and UTI. She needs a referral to urology. Has Noris WILSON O. Please advise.

## 2020-08-06 NOTE — TELEPHONE ENCOUNTER
Maris Winter  [address]  [address] [phone]     Left detailed message per HIPAA. Informed of referral placed & provided info as noted above.

## 2020-08-06 NOTE — TELEPHONE ENCOUNTER
Patient went to the ER at [facility] (records in [facility]), and the ER doctor recommended she see a urologist ASAP. She is wondering who Dr Marc Almanza would recommend. Please advise. Thank you!

## 2020-08-06 NOTE — TELEPHONE ENCOUNTER
Patient has severe pain in the right side of her abdomine that started last night. She also has been vomiting. She also has burning when she urinates. She thinks she might have a kidney stone because she has had these symptoms before. Please advise.   Sienna Escalante

## 2020-08-08 ENCOUNTER — HOSPITAL ENCOUNTER (OUTPATIENT)
Dept: CT IMAGING | Facility: HOSPITAL | Age: 75
Discharge: HOME | End: 2020-08-08
Attending: STUDENT IN AN ORGANIZED HEALTH CARE EDUCATION/TRAINING PROGRAM
Payer: MEDICARE

## 2020-08-08 ENCOUNTER — HOSPITAL ENCOUNTER (OUTPATIENT)
Dept: MAMMOGRAPHY | Age: 75
Discharge: HOME | End: 2020-08-08
Attending: STUDENT IN AN ORGANIZED HEALTH CARE EDUCATION/TRAINING PROGRAM
Payer: MEDICARE

## 2020-08-08 ENCOUNTER — HOSPITAL ENCOUNTER (OUTPATIENT)
Dept: BONE DENSITY | Age: 75
Discharge: HOME | End: 2020-08-08
Attending: STUDENT IN AN ORGANIZED HEALTH CARE EDUCATION/TRAINING PROGRAM
Payer: MEDICARE

## 2020-08-08 DIAGNOSIS — Z12.31 ENCOUNTER FOR SCREENING MAMMOGRAM FOR BREAST CANCER: ICD-10-CM

## 2020-08-08 DIAGNOSIS — F17.200 TOBACCO USE DISORDER: ICD-10-CM

## 2020-08-08 DIAGNOSIS — M81.0 AGE-RELATED OSTEOPOROSIS WITHOUT CURRENT PATHOLOGICAL FRACTURE: ICD-10-CM

## 2020-08-08 PROCEDURE — 77080 DXA BONE DENSITY AXIAL: CPT | Performed by: STUDENT IN AN ORGANIZED HEALTH CARE EDUCATION/TRAINING PROGRAM

## 2020-08-08 PROCEDURE — 77067 SCR MAMMO BI INCL CAD: CPT | Performed by: STUDENT IN AN ORGANIZED HEALTH CARE EDUCATION/TRAINING PROGRAM

## 2020-08-08 PROCEDURE — 77063 BREAST TOMOSYNTHESIS BI: CPT | Performed by: STUDENT IN AN ORGANIZED HEALTH CARE EDUCATION/TRAINING PROGRAM

## 2020-08-10 ENCOUNTER — TELEPHONE (OUTPATIENT)
Dept: INTERNAL MEDICINE CLINIC | Facility: CLINIC | Age: 75
End: 2020-08-10

## 2020-08-19 DIAGNOSIS — R32 URINARY INCONTINENCE, UNSPECIFIED TYPE: ICD-10-CM

## 2020-08-19 RX ORDER — OXYBUTYNIN CHLORIDE 5 MG/1
TABLET ORAL
Qty: 270 TABLET | Refills: 1 | Status: SHIPPED | OUTPATIENT
Start: 2020-08-19 | End: 2020-10-08

## 2020-08-25 ENCOUNTER — TELEPHONE (OUTPATIENT)
Dept: INTERNAL MEDICINE CLINIC | Facility: CLINIC | Age: 75
End: 2020-08-25

## 2020-08-25 DIAGNOSIS — M81.0 OSTEOPOROSIS, UNSPECIFIED OSTEOPOROSIS TYPE, UNSPECIFIED PATHOLOGICAL FRACTURE PRESENCE: Primary | ICD-10-CM

## 2020-08-25 NOTE — TELEPHONE ENCOUNTER
Spoke to pt. Pt states she did listen to message as noted from result note. Pt states could not understand. Advised of recommendation for OP clinic d/t DEXA results. Provided OP clinic number to follow-up.  [phone]  Pt voiced understanding

## 2020-08-25 NOTE — TELEPHONE ENCOUNTER
Pt states that she was called and someone left a message about her osteoporosis Pt was told that if she had another questions to call the office. Pt does not have name of new Dr or another info.    Please advise  Thank you

## 2020-08-27 ENCOUNTER — TELEPHONE (OUTPATIENT)
Dept: SURGERY | Facility: CLINIC | Age: 75
End: 2020-08-27

## 2020-08-27 ENCOUNTER — OFFICE VISIT (OUTPATIENT)
Dept: SURGERY | Facility: CLINIC | Age: 75
End: 2020-08-27
Payer: MEDICARE

## 2020-08-27 VITALS — DIASTOLIC BLOOD PRESSURE: 72 MMHG | SYSTOLIC BLOOD PRESSURE: 130 MMHG | HEART RATE: 76 BPM

## 2020-08-27 DIAGNOSIS — R82.90 URINE FINDING: Primary | ICD-10-CM

## 2020-08-27 DIAGNOSIS — N20.0 BILATERAL KIDNEY STONES: ICD-10-CM

## 2020-08-27 DIAGNOSIS — N20.0 RIGHT RENAL STONE: Primary | ICD-10-CM

## 2020-08-27 LAB
APPEARANCE: CLEAR
MULTISTIX LOT#: 1044 NUMERIC
NITRITE, URINE: POSITIVE
PH, URINE: 6.5 (ref 4.5–8)
SPECIFIC GRAVITY: 1.03 (ref 1–1.03)
URINE-COLOR: YELLOW
UROBILINOGEN,SEMI-QN: 0.2 MG/DL (ref 0–1.9)

## 2020-08-27 PROCEDURE — 3075F SYST BP GE 130 - 139MM HG: CPT | Performed by: UROLOGY

## 2020-08-27 PROCEDURE — 99203 OFFICE O/P NEW LOW 30 MIN: CPT | Performed by: UROLOGY

## 2020-08-27 PROCEDURE — 81003 URINALYSIS AUTO W/O SCOPE: CPT | Performed by: UROLOGY

## 2020-08-27 PROCEDURE — 3078F DIAST BP <80 MM HG: CPT | Performed by: UROLOGY

## 2020-08-27 RX ORDER — SULFAMETHOXAZOLE AND TRIMETHOPRIM 800; 160 MG/1; MG/1
TABLET ORAL
Qty: 20 TABLET | Refills: 1 | Status: SHIPPED | OUTPATIENT
Start: 2020-08-27 | End: 2020-10-08

## 2020-08-27 NOTE — PATIENT INSTRUCTIONS
Shock Wave Lithotripsy  Passing a kidney stone can be very painful. Shock wave lithotripsy is a treatment that helps by breaking the kidney stone into smaller pieces that are easier to pass.  This treatment is also called extracorporeal shock wave lithotr It can take a day to several weeks for the pieces of stone to leave your body. Drink plenty of liquids --at least 8 to12-ounce glasses of water a day-- to help flush your system.  During this time:   · Your urine may be cloudy or slightly bloody. You may ev

## 2020-08-27 NOTE — TELEPHONE ENCOUNTER
Urology Surgery Request  Surgeon:  Mervin Monk  Location: [facility] Center for surgery  Procedure:  Right ESWL, poss cysto stent  Anesthesia General  Time Frame: pt preference  Time required:   75 mins  Diagnosis: Right renal stone  Contact phone number:

## 2020-08-27 NOTE — PROGRESS NOTES
Rooming Clinician:     Michael Sow is a 69year old female.   Patient presents with:  Consult: c/o Kidney stone   Kidney Stones / Ureteral Stone  Pain: No   right  Nausea: No    Vomiting: No    Previous Stones: Yes: 12 yrs ago     DIAGNOSIS / Char Steward 100 MG Oral Cap Take 1 capsule (100 mg total) by mouth 2 (two) times daily. 10 capsule 0   • Risedronate Sodium 150 MG Oral Tab Take 1 tablet (150 mg total) by mouth every 30 (thirty) days.  12 tablet 0   • Diclofenac Sodium 1 % Transdermal Gel Apply 2 g to 6/30/2009    TECHNIQUE: CT of the abdomen and pelvis was performed following the intravenous administration of 100 mL of Isovue-370 with multiplanar reformatted images. Automated exposure control was utilized in this examination.     FINDINGS:   Incidentall subsegmental atelectasis. Abdominal aorta normal in course and caliber with scattered atherosclerotic calcifications. Liver, spleen, pancreas, and gallbladder are unremarkable. Nodular thickening of both adrenal glands, left worse than right.      Ezekiel stent    Extracorporeal shockwave lithotripsy(ESWL) is a non-invasive method of breaking up kidney and ureteral stones.   I briefly reviewed shock wave lithotripsy works and I discussed the risks and complications of ESWL with the patient including kidney t

## 2020-08-28 ENCOUNTER — TELEPHONE (OUTPATIENT)
Dept: SURGERY | Facility: CLINIC | Age: 75
End: 2020-08-28

## 2020-08-28 NOTE — TELEPHONE ENCOUNTER
RN called patient to relay MD's message and recommendations, \"Your recent urine culture is abnormal.  It is growing Proteus organisms.  Recommend Bactrim double strength 1 p.o. twice daily #20.  Patient needs to have ultrasound and KUB done as soon as pos

## 2020-08-28 NOTE — TELEPHONE ENCOUNTER
Patient called back this date and surgery was scheduled for 9-17-20 at [address] [facility], [address] for Surgery at 7:00 am.  Patient was notified this date and per Danilo Villalobos she will not need to get an H&P prior to surgery. Dr. Rahat Causey will take care of it.  Patient understoo

## 2020-08-28 NOTE — PROGRESS NOTES
Your recent urine culture is abnormal.  It is growing Proteus organisms. Recommend Bactrim double strength 1 p.o. twice daily #20. Patient needs to have ultrasound and KUB done as soon as possible.

## 2020-08-31 ENCOUNTER — TELEPHONE (OUTPATIENT)
Dept: SURGERY | Facility: CLINIC | Age: 75
End: 2020-08-31

## 2020-09-11 ENCOUNTER — HOSPITAL ENCOUNTER (OUTPATIENT)
Dept: GENERAL RADIOLOGY | Age: 75
Discharge: HOME | End: 2020-09-11
Attending: UROLOGY
Payer: MEDICARE

## 2020-09-11 ENCOUNTER — HOSPITAL ENCOUNTER (OUTPATIENT)
Dept: ULTRASOUND IMAGING | Age: 75
Discharge: HOME | End: 2020-09-11
Attending: UROLOGY
Payer: MEDICARE

## 2020-09-11 DIAGNOSIS — N20.0 BILATERAL KIDNEY STONES: ICD-10-CM

## 2020-09-11 PROCEDURE — 74018 RADEX ABDOMEN 1 VIEW: CPT | Performed by: UROLOGY

## 2020-09-11 PROCEDURE — 76775 US EXAM ABDO BACK WALL LIM: CPT | Performed by: UROLOGY

## 2020-09-14 ENCOUNTER — APPOINTMENT (OUTPATIENT)
Dept: LAB | Facility: HOSPITAL | Age: 75
End: 2020-09-14
Attending: UROLOGY
Payer: MEDICARE

## 2020-09-14 DIAGNOSIS — Z01.818 PRE-PROCEDURAL EXAMINATION: ICD-10-CM

## 2020-09-16 ENCOUNTER — TELEPHONE (OUTPATIENT)
Dept: SURGERY | Facility: CLINIC | Age: 75
End: 2020-09-16

## 2020-09-16 NOTE — TELEPHONE ENCOUNTER
Per Charlotte Gunter, pt is scheduled for surgery tomorrow. Pt has order for levaquin, but they do not have have that.  Wanting to know if there is an alternative Dr. Juani Nunez prefers/ please advise

## 2020-09-16 NOTE — TELEPHONE ENCOUNTER
Spoke with Marcial Miller RN at [facility] for Surgery. Per Dr Edita Damico, order given to change Levaquin 500mg IV pre op to Cipro 400 mg IV pre op.

## 2020-09-17 ENCOUNTER — TELEPHONE (OUTPATIENT)
Dept: SURGERY | Facility: CLINIC | Age: 75
End: 2020-09-17

## 2020-09-17 LAB — SARS-COV-2 RNA RESP QL NAA+PROBE: NOT DETECTED

## 2020-09-17 NOTE — TELEPHONE ENCOUNTER
Patient called back this afternoon stating she did not want a 1:00 pm surgery time because she gets headaches when she doesn't drink coffee.   She is requesting that her case be moved to an earlier time of day even if that means pushing it back until Mani

## 2020-09-17 NOTE — TELEPHONE ENCOUNTER
Hi,  Could you please reschedule this patient for surgery? She was scheduled today 9/17 at [address] [place] [address], [address] for Surgery and her Covid test result did not come back in time. Thank you!      Justine Bean     Urology Surgery Request  Surgeon: Dr Simba Rubio  Location (if

## 2020-09-17 NOTE — TELEPHONE ENCOUNTER
Called patient this date and tried to reschedule for 9/21/20 at [facility] but due to patient's insurance, she would have to go to [facility] and their schedule and Dr. Rylee Knox did not align.   Rescheduled for CFS on 9/30/20 at 1:00 pm.  Will update

## 2020-09-17 NOTE — TELEPHONE ENCOUNTER
Managed care-please review-note surgery did not take place on 9/17/20 due to Covid test not ready.   Please [facility]

## 2020-09-17 NOTE — TELEPHONE ENCOUNTER
Pt calling to [facility] states her covid test results hasn't come back yet so they told her to [facility] please advise

## 2020-09-26 ENCOUNTER — APPOINTMENT (OUTPATIENT)
Dept: LAB | Facility: HOSPITAL | Age: 75
End: 2020-09-26
Attending: UROLOGY
Payer: MEDICARE

## 2020-09-26 DIAGNOSIS — Z01.818 PRE-PROCEDURAL EXAMINATION: ICD-10-CM

## 2020-09-27 LAB — SARS-COV-2 RNA RESP QL NAA+PROBE: NOT DETECTED

## 2020-09-28 ENCOUNTER — TELEPHONE (OUTPATIENT)
Dept: SURGERY | Facility: CLINIC | Age: 75
End: 2020-09-28

## 2020-09-28 NOTE — TELEPHONE ENCOUNTER
RN called patient in response to her request for a call this morning for her COVID test result. Results relayed. Patient was also inquiring about the address of her surgery [facility] RN forwarded to Jacques Myers, Surgery Schedule.      Collected:  9/14/202

## 2020-09-29 ENCOUNTER — TELEPHONE (OUTPATIENT)
Dept: SURGERY | Facility: CLINIC | Age: 75
End: 2020-09-29

## 2020-09-30 ENCOUNTER — TELEPHONE (OUTPATIENT)
Dept: SURGERY | Facility: CLINIC | Age: 75
End: 2020-09-30

## 2020-09-30 NOTE — TELEPHONE ENCOUNTER
RN called patient and relayed MD's message and    recommendations, \"Sent prescription for Toradol to patient's pharmacy.  Patients will get pain when they urinate because of the stent going up into the kidney.  \" RN left message that pain med sent to phar

## 2020-09-30 NOTE — TELEPHONE ENCOUNTER
Pt called stating pt had surgery yesterday 9-29-20. Pt is having pain. Requesting to speak to the office. Call transferred to the nurse.

## 2020-09-30 NOTE — TELEPHONE ENCOUNTER
RN received a call from this patient complaining of pain everytime she urinates. S/P Right ESWL cysotscopy, retrograde pyelogram and ureteral stent insertion on 9/30.  Said, that she does not take Norco due to previous reactions, but she takes Tylenol for p

## 2020-10-01 RX ORDER — ALLOPURINOL 300 MG/1
300 TABLET ORAL DAILY
Qty: 90 TABLET | Refills: 0 | Status: SHIPPED | OUTPATIENT
Start: 2020-10-01 | End: 2020-10-12

## 2020-10-01 NOTE — TELEPHONE ENCOUNTER
Second call. RN called patient and relayed MD's message and recommendations, \"Sent prescription for Toradol to patient's pharmacy.  Patients will get pain when they urinate because of the stent going up into the kidney.  \" RN left message that pain med se

## 2020-10-01 NOTE — TELEPHONE ENCOUNTER
RN called pharmacy[facility] to inquire when this medication prescribed. RN unable to see Allopurinol in patient's medlist. Per Simmie Coma, it was prescribed yesterday, 10/1.

## 2020-10-02 ENCOUNTER — TELEPHONE (OUTPATIENT)
Dept: SURGERY | Facility: CLINIC | Age: 75
End: 2020-10-02

## 2020-10-02 ENCOUNTER — TELEPHONE (OUTPATIENT)
Dept: INTERNAL MEDICINE CLINIC | Facility: CLINIC | Age: 75
End: 2020-10-02

## 2020-10-02 NOTE — TELEPHONE ENCOUNTER
Pt called and had procedure on Tues for Kidney stones and Pt is thinking the pain meds are giving her trouble. Pt started having Diarrhea and vomiting starting on Wed. Pt was told to call her PCP. Can Pt take something else?   Please advise  Thank you

## 2020-10-02 NOTE — TELEPHONE ENCOUNTER
RN called patient. No answer. Left message to increase fluid intake, to take Imodium - an OTC anti-diarrhea med, to avoid greasy food & dairy products, and to call her PCP.      Note, she is a S/P Right ESWL cystoscopy, retrograde pyelogram and ureteral fredy

## 2020-10-02 NOTE — TELEPHONE ENCOUNTER
RN received a call from this patient complaining of nausea, vomiting and diarrhea for the past 2 days. Per patient she refused to take the Alluporinol and the Ketorolac. She was taking the Potassium Citrate but that made her sick.  She is eating toast, and

## 2020-10-05 NOTE — TELEPHONE ENCOUNTER
Call received from pt on friday received 10 min before clinic closing on a Friday 10/2/20. Reviewed chart. See 10/2/20 TE with urology nurse, they will be following up with pt today. Pt was given instructions for weekend. Spoke with pt.  Had recent urol

## 2020-10-05 NOTE — TELEPHONE ENCOUNTER
Noted below. Since the patient also called us Friday as well as urology can we please return her call and check on her. Thanks.

## 2020-10-07 ENCOUNTER — TELEPHONE (OUTPATIENT)
Dept: SURGERY | Facility: CLINIC | Age: 75
End: 2020-10-07

## 2020-10-07 NOTE — TELEPHONE ENCOUNTER
Roosevelt Dao from [address] [place] [address], [address] for Surgery called stating that the service performed on 9/29/20 was denied by patient's insurance and she cannot see the referral.  Please call her at [phone]

## 2020-10-08 ENCOUNTER — TELEPHONE (OUTPATIENT)
Dept: SURGERY | Facility: CLINIC | Age: 75
End: 2020-10-08

## 2020-10-08 ENCOUNTER — PROCEDURE (OUTPATIENT)
Dept: SURGERY | Facility: CLINIC | Age: 75
End: 2020-10-08
Payer: MEDICARE

## 2020-10-08 VITALS — HEART RATE: 89 BPM | TEMPERATURE: 97 F | SYSTOLIC BLOOD PRESSURE: 126 MMHG | DIASTOLIC BLOOD PRESSURE: 72 MMHG

## 2020-10-08 DIAGNOSIS — R82.90 URINE FINDING: Primary | ICD-10-CM

## 2020-10-08 DIAGNOSIS — N20.0 KIDNEY STONE: Primary | ICD-10-CM

## 2020-10-08 DIAGNOSIS — N20.0 KIDNEY STONES: ICD-10-CM

## 2020-10-08 PROCEDURE — 81003 URINALYSIS AUTO W/O SCOPE: CPT | Performed by: UROLOGY

## 2020-10-08 PROCEDURE — 99213 OFFICE O/P EST LOW 20 MIN: CPT | Performed by: UROLOGY

## 2020-10-08 PROCEDURE — 82365 CALCULUS SPECTROSCOPY: CPT | Performed by: UROLOGY

## 2020-10-08 RX ORDER — CIPROFLOXACIN 500 MG/1
500 TABLET, FILM COATED ORAL ONCE
Status: CANCELLED | OUTPATIENT
Start: 2020-10-08 | End: 2020-10-08

## 2020-10-08 RX ORDER — SOLIFENACIN SUCCINATE 10 MG/1
10 TABLET, FILM COATED ORAL DAILY
Qty: 30 TABLET | Refills: 0 | Status: SHIPPED | OUTPATIENT
Start: 2020-10-08 | End: 2020-10-12

## 2020-10-08 NOTE — TELEPHONE ENCOUNTER
LMTCB. Patient needs KUB prior to visit today to decide if the stent can be removed. Order put in Ciaran. Per Dr Leif Naqvi he still would like to see her today even if the xray is not done.

## 2020-10-08 NOTE — PROGRESS NOTES
Rooming Clinician:     Sussy Garcia is a 69year old female. Patient presents with: Follow - Up: S/P Right ESWL cystoscopy, retrograde pyelogram and ureteral stent insertion on 9/30.         HPI:     Patient had ESWL with insertion of a right [place] capsule 0   • Risedronate Sodium 150 MG Oral Tab Take 1 tablet (150 mg total) by mouth every 30 (thirty) days. 12 tablet 0   • Diclofenac Sodium 1 % Transdermal Gel Apply 2 g topically 3 (three) times daily.  1 Tube 3     No Known Allergies   Past Medical H the right kidney    PLAN:     Discontinue oxybutynin  Brooks fluid hydration  I explained stent related symptoms to the patient and urged patience  Vesicare 10 mg daily  Noncontrast CT abdomen and pelvis kidney stone protocol and follow-up in 7 to 10 days

## 2020-10-09 ENCOUNTER — TELEPHONE (OUTPATIENT)
Dept: SURGERY | Facility: CLINIC | Age: 75
End: 2020-10-09

## 2020-10-09 NOTE — TELEPHONE ENCOUNTER
pt. states that insur will not cover prescribed and that it will cost over $300. Pt. Wants to know if dr is able to prescribed something that is covered under her insur. ?

## 2020-10-09 NOTE — TELEPHONE ENCOUNTER
RN called patient to offer 10/23 Friday 3pm for cysto with stent removal. Left message for call back.

## 2020-10-12 ENCOUNTER — TELEPHONE (OUTPATIENT)
Dept: SURGERY | Facility: CLINIC | Age: 75
End: 2020-10-12

## 2020-10-12 RX ORDER — OXYBUTYNIN CHLORIDE 10 MG/1
10 TABLET, EXTENDED RELEASE ORAL DAILY
Qty: 90 TABLET | Refills: 1 | Status: SHIPPED | OUTPATIENT
Start: 2020-10-12 | End: 2021-01-10

## 2020-10-12 RX ORDER — ALLOPURINOL 300 MG/1
300 TABLET ORAL DAILY
Qty: 90 TABLET | Refills: 1 | Status: SHIPPED | OUTPATIENT
Start: 2020-10-12 | End: 2021-01-10

## 2020-10-12 NOTE — TELEPHONE ENCOUNTER
In Dr Korene Angelucci absence,  [facility] has prescribed Oxybutynin ER 10 mg daily #90. Patient notified via identified VM that the new medication has been sent to her pharmacy. Will call back with any questions.

## 2020-10-12 NOTE — TELEPHONE ENCOUNTER
RN received a call from this patient. Requested for a sooner appt. Offerd 10/19 9:30am for cysto and stent removal. 10/23 appt cancelled. Patient agreeable to plans and verbalized udnerstanding.

## 2020-10-13 NOTE — TELEPHONE ENCOUNTER
T#[id number] was for cpt code 41847. I believe Dr. Seema Kline changed procedures and [facility] billed [id number]; [id number]; and [id number]. Is it possible to get a retro referral.  Insurance is denying saying no auth on file.

## 2020-10-14 ENCOUNTER — HOSPITAL ENCOUNTER (OUTPATIENT)
Dept: CT IMAGING | Facility: HOSPITAL | Age: 75
Discharge: HOME | End: 2020-10-14
Attending: UROLOGY
Payer: MEDICARE

## 2020-10-14 DIAGNOSIS — N20.0 KIDNEY STONES: ICD-10-CM

## 2020-10-14 PROCEDURE — 74176 CT ABD & PELVIS W/O CONTRAST: CPT | Performed by: UROLOGY

## 2020-10-15 NOTE — PROGRESS NOTES
PHYSICAL THERAPY EVALUATION NOTE    Patient Name: Arun Gamez  PNBRD'A Date: 2020     AGE:   72 y o   Mrn:   [id number]  ADMIT DX:  Hyponatremia [E87 1]  Appendicitis [K37]  Abdominal pain [R10 9]    Past Medical History:   Diagnosis Date    Diabetes mellitus ([address] )     Hyperlipidemia     Hypertension      Length Of Stay: 1  PHYSICAL THERAPY EVALUATION :       20 1112   Note Type   Note type Eval only   Pain Assessment   Pain Assessment 0-10   Pain Score 3   Pain Type Acute pain;Surgical pain   Pain Location Abdomen   Home Living   Type of [address] One level;Elevator   Bathroom Shower/Tub   (Has both, plan to move her to walk-in shower room)   [address]   (None)   Prior Function   Level of [place] Independent with ADLs and functional mobility   Lives With Daughter; Other (Comment)  (MIGUEL,  grandchild)   ADL Assistance Independent   IADLs Independent   Falls in the last 6 months 1 to 4  (1)   Comments Pt is visiting daughter and son-in-law until   Restrictions/Precautions   Weight Bearing Precautions Per Order No   Other Precautions Multiple lines;Pain  (IV)   General   Additional Pertinent History Pt is [place]-speaking only  MIGUEL is at bedside, pt declines [employer]  and gives permission for [facility] [place] to translate   Family/Caregiver Present Yes  (Son-in-law)   Cognition   Arousal/Participation Alert   Orientation Level Oriented X4  (Pt identified by ID bracelet for name and )   Following Commands Follows one step commands without difficulty   Comments Pt is OOB in recliner chair  She states she is agreeable to PT intervention      RLE Assessment   RLE Assessment WFL   Strength RLE   RLE Overall Strength 4/5   LLE Assessment   LLE Assessment WFL   Strength LLE   LLE Overall Strength 4/5   Bed Mobility   Supine to Sit Unable to assess  (Pt OOB in recliner chair at start of Your recent CT scan shows that the stones in the right kidney have been completely resolved. Recommend follow up in the office To remove the stent as soon as convenient. I will be back in the office next week. .    Sincerely,  Pavan Ortiz MD session)   Sit to Supine 5  Supervision   Additional items Assist x 1; Increased time required   Transfers   Sit to Stand 6  Modified independent   Additional items Increased time required   Stand to Sit 6  Modified independent   Additional items Increased time required   Ambulation/Elevation   Gait pattern Decreased foot clearance; Short stride   Gait Assistance 5  Supervision   Additional items Assist x 1  (for IV pole management)   Assistive Device None   Distance 200 ft  (addt'l not possible due to fatigue, pain)   Balance   Static Sitting Good   Static Standing Good   Ambulatory Fair -   Activity Tolerance   Activity Tolerance Patient limited by pain; Patient limited by fatigue   Medical Staff Made Aware Spoke to Renae Gipson2 Edel Kent Lunch   Nurse Made Aware Spoke to RN Aparna Malone who cleared pt for intervention   Assessment   Prognosis Good   Problem List Decreased strength;Decreased endurance; Impaired balance;Decreased mobility;Pain   Assessment Ashely Oconnell is a 73 y/o Female who [facility] on [facility]13/2020 from home w/ c/o RUQ pain and nausea, with a diagnosis of appendicitis, s/p appendectomy  Received orders for PT eval and treat, with activity order(s) of up and OOB as tolerated   This patient presents w/ comorbidities of HTN, DMII, HLD, anemia and has personal factors of preferred language not [place] (language barrier) and positive fall history  Patient presents with the following impairments at time of evaluation including: pain, decreased endurance, impaired balance and gait deviations  These impairments are evident in findings from the physical examination weakness, mobility assessment (need for supervision to mod I assist w/ all phases of mobility when usually mobilizing independently and tolerance to only 200 feet of ambulation), and objective measure(s) Barthel Index: 85/100   This patient's clinical presentation is evolving, which is evident in need for assist w/ all phases of mobility when usually mobilizing independently and pain impacting overall mobility status  Discharge recommendation is for home w/ family support in order to reduce fall risk and maximize level of functional independence  Given the above findings from this evaluation, at this time this patient does not require skilled inpatient PT  Will D/C patient from PT caseload  Goals   Patient Goals less pain   Recommendation   Recommendation Home with family support   Equipment Recommended Other (Comment)  (None)   Barthel Index   Feeding 10   Bathing 0   Grooming Score 5   Dressing Score 10   Bladder Score 10   Bowels Score 10   Toilet Use Score 10   Transfers (Bed/Chair) Score 15   Mobility (Level Surface) Score 10   Stairs Score 5   Barthel Index Score 85     Will D/C pt from PT caseload      Leoncio Marley, PT, DPT

## 2020-10-16 ENCOUNTER — TELEPHONE (OUTPATIENT)
Dept: SURGERY | Facility: CLINIC | Age: 75
End: 2020-10-16

## 2020-10-16 NOTE — TELEPHONE ENCOUNTER
RN called patient to relay MD's message and recommendations,     \"Your recent CT scan shows that the stones in the right kidney have been completely resolved.  Recommend follow up in the office To remove the stent as soon as convenient.  I will be back in

## 2020-10-19 ENCOUNTER — PROCEDURE (OUTPATIENT)
Dept: SURGERY | Facility: CLINIC | Age: 75
End: 2020-10-19
Payer: MEDICARE

## 2020-10-19 VITALS — HEART RATE: 88 BPM | TEMPERATURE: 97 F | DIASTOLIC BLOOD PRESSURE: 72 MMHG | SYSTOLIC BLOOD PRESSURE: 111 MMHG

## 2020-10-19 DIAGNOSIS — N20.0 RENAL CALCULUS, RIGHT: ICD-10-CM

## 2020-10-19 DIAGNOSIS — R82.90 URINE FINDING: Primary | ICD-10-CM

## 2020-10-19 PROCEDURE — 52310 CYSTOSCOPY AND TREATMENT: CPT | Performed by: UROLOGY

## 2020-10-19 PROCEDURE — 81003 URINALYSIS AUTO W/O SCOPE: CPT | Performed by: UROLOGY

## 2020-10-19 RX ORDER — CIPROFLOXACIN 500 MG/1
500 TABLET, FILM COATED ORAL ONCE
Status: DISCONTINUED | OUTPATIENT
Start: 2020-10-19 | End: 2021-05-28

## 2020-10-19 NOTE — PROGRESS NOTES
Rooming Clinician:     Kristan Allison is a 69year old female.   Patient presents with:  Procedure: cystoscopy with stent removal        HPI:     Patient returns for cystoscopy and stent removal.  She has a complicated history of a large proximal right u tablet (100 mg total) by mouth every evening. 90 tablet 1   • atorvastatin 10 MG Oral Tab Take 1 tablet (10 mg total) by mouth nightly.  Appt Due For Further Refills 90 tablet 1   • Sertraline HCl 100 MG Oral Tab Take 2 tablets (200 mg total) by mouth daily bladder under direct vision and the right ureteral stent was grasped and retrieved. The patient was given a single dose of a prophylactic antibiotic post procedure and advised to return for follow-up as indicated.       BACK: no CVA tenderness  NEURO: graciela lesion. BILIARY:  No visible dilatation or calcification. PANCREAS:  No lesion, fluid collection, ductal dilatation, or atrophy. SPLEEN:  No enlargement or focal lesion. AORTA/VASCULAR:  Vascular calcifications without evidence of aortic aneurysm.  RETR reduce urinary acidity and reducing sodium in the diet to encourage resorbtion of urinary calcium as well as some supplements that are used for the treatment of stone diseases.   Sometimes prescription medications are needed to adjust calcium and uric acid

## 2020-10-19 NOTE — TELEPHONE ENCOUNTER
Second call.  RN called patient again in response to missed call last week and to relay MD's message and recommendations,      \"Your recent CT scan shows that the stones in the right kidney have been completely resolved.  Recommend follow up in the office

## 2020-10-19 NOTE — TELEPHONE ENCOUNTER
Good Morning    I was not able to call last week- - I called this morning and the insurance is closed right now. I am on the phones this morning and not able to make outbound calls.  Can this wait til tomorrow or can you have insurance verification assist?    Thanks    [address] [facility]

## 2020-10-20 NOTE — TELEPHONE ENCOUNTER
Thank you Ian! I can't view the referral can you please email or fax it to me so I can forward to the surgery center. Fax is [phone] or [email or web address].

## 2020-10-21 NOTE — TELEPHONE ENCOUNTER
Thanks Ian-I have received and forwarded to [facility]. I appreciate all your help in this matter.

## 2020-10-26 ENCOUNTER — TELEPHONE (OUTPATIENT)
Dept: INTERNAL MEDICINE CLINIC | Facility: CLINIC | Age: 75
End: 2020-10-26

## 2020-10-26 DIAGNOSIS — M81.0 AGE-RELATED OSTEOPOROSIS WITHOUT CURRENT PATHOLOGICAL FRACTURE: Primary | ICD-10-CM

## 2020-10-26 NOTE — TELEPHONE ENCOUNTER
Pt called and said at Dr Lili Velasquez said that she needs to call us to get a referral for infusion shot for arthritis. Pt not sure what she needs she just knows that she needed a referral written by us not Dr Lili Velasquez.    Please call Pt when referral is done  Thank

## 2020-10-26 NOTE — TELEPHONE ENCOUNTER
Pt with [facility], rheumatologist could not get procedure approved through insurance due to pt not having [facility] PCP. Insurance requires that PCP order infusion. Okay to place? Pended. To notify pt.

## 2020-10-28 NOTE — TELEPHONE ENCOUNTER
Spoke to Mark and referral has been approved. Per Mark, Dr. Samir Cardoza office can call pt insurance to add CPT code to an already approved referral.  Spoke to pt and informed her that referral has been approved.   And gave her the authorization number to give t

## 2020-11-09 NOTE — TELEPHONE ENCOUNTER
Brandi called from 's office and said that they are unable to give the pt a referral for the infusion because the pt has an [facility] PCP and not a [facility].      CPT code-   Infusion   Needs to be a referral for an [facility]

## 2020-11-09 NOTE — TELEPHONE ENCOUNTER
Spoke to pt. Pt states she just got off phone with Rheumatology & states she was told to contact PCP regarding where to go for Reclast infusion. Advised pt that referral order was placed for Dr. Alex Sheppard & that that was approved.   Instructed that they would

## 2020-11-09 NOTE — TELEPHONE ENCOUNTER
Spoke to Mark from Referral Dept  Provided CPT code  [facility] will call pt's insurance to add on CPT code to already approved referral for Dr. Jose Daniel Muñoz

## 2020-11-11 ENCOUNTER — TELEPHONE (OUTPATIENT)
Dept: SURGERY | Facility: CLINIC | Age: 75
End: 2020-11-11

## 2020-11-11 NOTE — TELEPHONE ENCOUNTER
[facility] Clinical Staff             Hello,     This referral has been approved.      Authorization # [id number]   E/D 10/24/20 - 2/24/2021   CPT   60186 - office visit = 3 visits    ZOLEDRONIC ACID 1MG = 1 unit     Thank you,   Odalis Chin

## 2021-01-05 ENCOUNTER — MED REC SCAN ONLY (OUTPATIENT)
Dept: SURGERY | Facility: CLINIC | Age: 76
End: 2021-01-05

## 2021-01-12 DIAGNOSIS — E78.2 MIXED HYPERLIPIDEMIA: ICD-10-CM

## 2021-01-12 DIAGNOSIS — F32.9 MAJOR DEPRESSIVE DISORDER, REMISSION STATUS UNSPECIFIED, UNSPECIFIED WHETHER RECURRENT: ICD-10-CM

## 2021-01-12 DIAGNOSIS — F51.04 PSYCHOPHYSIOLOGICAL INSOMNIA: ICD-10-CM

## 2021-01-12 RX ORDER — SERTRALINE HYDROCHLORIDE 100 MG/1
TABLET, FILM COATED ORAL
Qty: 180 TABLET | Refills: 0 | Status: SHIPPED | OUTPATIENT
Start: 2021-01-12 | End: 2021-01-22

## 2021-01-12 RX ORDER — ATORVASTATIN CALCIUM 10 MG/1
TABLET, FILM COATED ORAL
Qty: 90 TABLET | Refills: 0 | Status: SHIPPED | OUTPATIENT
Start: 2021-01-12 | End: 2021-01-22

## 2021-01-12 RX ORDER — TRAZODONE HYDROCHLORIDE 100 MG/1
TABLET ORAL
Qty: 90 TABLET | Refills: 0 | Status: SHIPPED | OUTPATIENT
Start: 2021-01-12 | End: 2021-01-22

## 2021-01-12 NOTE — TELEPHONE ENCOUNTER
Last OV relevant to medication: 7/24/2020 - Supervisit  Last refill date: 7/24/2020     #/refills: 90/1  When pt was asked to return for OV: 6 months - Follow Up  Upcoming appt/reason: 1/22/2021 - 6 month follow up    Lab Results   Component Value Date

## 2021-01-22 ENCOUNTER — OFFICE VISIT (OUTPATIENT)
Dept: INTERNAL MEDICINE CLINIC | Facility: CLINIC | Age: 76
End: 2021-01-22
Payer: MEDICARE

## 2021-01-22 VITALS
SYSTOLIC BLOOD PRESSURE: 126 MMHG | TEMPERATURE: 98 F | DIASTOLIC BLOOD PRESSURE: 74 MMHG | RESPIRATION RATE: 16 BRPM | WEIGHT: 134.19 LBS | HEIGHT: 59.5 IN | BODY MASS INDEX: 26.7 KG/M2 | OXYGEN SATURATION: 98 % | HEART RATE: 87 BPM

## 2021-01-22 DIAGNOSIS — F32.9 MAJOR DEPRESSIVE DISORDER, REMISSION STATUS UNSPECIFIED, UNSPECIFIED WHETHER RECURRENT: ICD-10-CM

## 2021-01-22 DIAGNOSIS — F51.04 PSYCHOPHYSIOLOGICAL INSOMNIA: ICD-10-CM

## 2021-01-22 DIAGNOSIS — Z79.899 ENCOUNTER FOR LONG-TERM (CURRENT) USE OF MEDICATIONS: ICD-10-CM

## 2021-01-22 DIAGNOSIS — E78.2 MIXED HYPERLIPIDEMIA: Primary | ICD-10-CM

## 2021-01-22 PROBLEM — J41.0 SMOKERS' COUGH (HCC): Chronic | Status: ACTIVE | Noted: 2021-01-22

## 2021-01-22 PROCEDURE — 99214 OFFICE O/P EST MOD 30 MIN: CPT | Performed by: STUDENT IN AN ORGANIZED HEALTH CARE EDUCATION/TRAINING PROGRAM

## 2021-01-22 PROCEDURE — 3078F DIAST BP <80 MM HG: CPT | Performed by: STUDENT IN AN ORGANIZED HEALTH CARE EDUCATION/TRAINING PROGRAM

## 2021-01-22 PROCEDURE — 3074F SYST BP LT 130 MM HG: CPT | Performed by: STUDENT IN AN ORGANIZED HEALTH CARE EDUCATION/TRAINING PROGRAM

## 2021-01-22 PROCEDURE — 3008F BODY MASS INDEX DOCD: CPT | Performed by: STUDENT IN AN ORGANIZED HEALTH CARE EDUCATION/TRAINING PROGRAM

## 2021-01-22 RX ORDER — POTASSIUM CITRATE 15 MEQ/1
1 TABLET, EXTENDED RELEASE ORAL 4 TIMES DAILY
COMMUNITY
Start: 2021-01-14 | End: 2021-05-17 | Stop reason: ALTCHOICE

## 2021-01-22 RX ORDER — ATORVASTATIN CALCIUM 10 MG/1
10 TABLET, FILM COATED ORAL EVERY EVENING
Qty: 90 TABLET | Refills: 1 | Status: SHIPPED | OUTPATIENT
Start: 2021-01-22 | End: 2021-07-23

## 2021-01-22 RX ORDER — TRAZODONE HYDROCHLORIDE 100 MG/1
100 TABLET ORAL EVERY EVENING
Qty: 90 TABLET | Refills: 1 | Status: SHIPPED | OUTPATIENT
Start: 2021-01-22 | End: 2021-03-25

## 2021-01-22 RX ORDER — OXYBUTYNIN CHLORIDE 10 MG/1
10 TABLET, EXTENDED RELEASE ORAL DAILY
COMMUNITY
Start: 2021-01-12 | End: 2021-02-23

## 2021-01-22 RX ORDER — SERTRALINE HYDROCHLORIDE 100 MG/1
200 TABLET, FILM COATED ORAL DAILY
Qty: 180 TABLET | Refills: 1 | Status: SHIPPED | OUTPATIENT
Start: 2021-01-22 | End: 2021-07-23

## 2021-01-22 NOTE — PROGRESS NOTES
[facility]    REASON FOR VISIT:    Current Complaints: Patient presents with: Follow - Up: HLD      HPI:  Dennis Arshad is a 77year old female who presents for 6 months f/u. Depression: she feels it's well controlled.  She takes his medicat Negative for fever, chills and fatigue. Neurological: Negative for headaches, dizziness, extremity weakness. HENT: Negative for hearing loss, congestion, sore throat and neck pain. Eyes: Negative for pain and visual disturbance.    Respiratory: Negati is no tenderness. Musculoskeletal: Normal range of motion. No edema. Neurological: Alert and oriented to person, place, and time. Cranial nerves are intact,motor and sensory are grossly intact. Normal reflexes. Skin: Skin is warm. No rash noted.  No e supervisit.     Imaging & Consults:  None       Orders Placed This Encounter      Basic Metabolic Panel (8) [E]      Nigel Biggs MD  1/22/2021  9:58 AM

## 2021-01-26 ENCOUNTER — OFFICE VISIT (OUTPATIENT)
Dept: SURGERY | Facility: CLINIC | Age: 76
End: 2021-01-26
Payer: MEDICARE

## 2021-01-26 VITALS — SYSTOLIC BLOOD PRESSURE: 129 MMHG | TEMPERATURE: 97 F | DIASTOLIC BLOOD PRESSURE: 82 MMHG | HEART RATE: 66 BPM

## 2021-01-26 DIAGNOSIS — R82.90 URINE FINDING: Primary | ICD-10-CM

## 2021-01-26 LAB
APPEARANCE: CLEAR
BILIRUB UR QL: NEGATIVE
COLOR UR: YELLOW
GLUCOSE UR-MCNC: NEGATIVE MG/DL
KETONES UR-MCNC: NEGATIVE MG/DL
MULTISTIX LOT#: 5077 NUMERIC
NITRITE UR QL STRIP.AUTO: NEGATIVE
PH UR: 6 [PH] (ref 5–8)
PH, URINE: 6 (ref 4.5–8)
PROT UR-MCNC: NEGATIVE MG/DL
RBC #/AREA URNS AUTO: 2 /HPF
SP GR UR STRIP: 1.01 (ref 1–1.03)
SPECIFIC GRAVITY: 1.03 (ref 1–1.03)
URINE-COLOR: YELLOW
UROBILINOGEN UR STRIP-ACNC: <2
UROBILINOGEN,SEMI-QN: 0.2 MG/DL (ref 0–1.9)
WBC #/AREA URNS AUTO: 4 /HPF

## 2021-01-26 PROCEDURE — 99213 OFFICE O/P EST LOW 20 MIN: CPT | Performed by: UROLOGY

## 2021-01-26 PROCEDURE — 81003 URINALYSIS AUTO W/O SCOPE: CPT | Performed by: UROLOGY

## 2021-01-26 RX ORDER — OXYBUTYNIN CHLORIDE 5 MG/1
5 TABLET, EXTENDED RELEASE ORAL DAILY
Qty: 90 TABLET | Refills: 3 | Status: SHIPPED | OUTPATIENT
Start: 2021-01-26 | End: 2021-07-23 | Stop reason: ALTCHOICE

## 2021-01-26 NOTE — PROGRESS NOTES
Rooming Clinician:     Anselmo Kovacs is a 77year old female. Patient presents with: Follow - Up        HPI:     Patient comes to the office for follow-up examination. She has had no voiding symptoms or symptoms of urinary infection.  She had ESWL for complaint    EXAM:     /82 (BP Location: Left arm, Patient Position: Sitting, Cuff Size: large)   Pulse 66   Temp 97.2 °F (36.2 °C)   GENERAL: well developed, well nourished,in no apparent distress  SKIN: no rashes,no suspicious lesions  HEENT: atrau

## 2021-01-28 ENCOUNTER — TELEPHONE (OUTPATIENT)
Dept: SURGERY | Facility: CLINIC | Age: 76
End: 2021-01-28

## 2021-01-28 NOTE — TELEPHONE ENCOUNTER
RN called patient to relay MD's message and recommendations. No answer. RN left message to call back.      \"Your recent analysis showed a few bacteria is abnormal.  Because of the history of infection I recommend a catheterized sample of urine be taken fro

## 2021-01-28 NOTE — PROGRESS NOTES
Your recent urine culture shows no growth and is normal.  Recommend catheterized urine for culture and sensitivity based on the urinalysis showing bacteria and follow up in the office as directed.     Sincerely,  Karon Tyler MD

## 2021-02-01 NOTE — TELEPHONE ENCOUNTER
Pt called back and I gave her message from RP:      \"Your recent analysis showed a few bacteria is abnormal.  Because of the history of infection I recommend a catheterized sample of urine be taken from the bladder to be sent for culture and sensitivity t

## 2021-02-01 NOTE — TELEPHONE ENCOUNTER
I called the pt and left VM for the patient to schedule a nurse visit with catheterization for a urine sample. Rec she call back to schedule.           \"Your recent analysis showed a few bacteria is abnormal.  Because of the history of infection I recomme

## 2021-02-02 ENCOUNTER — NURSE ONLY (OUTPATIENT)
Dept: SURGERY | Facility: CLINIC | Age: 76
End: 2021-02-02
Payer: MEDICARE

## 2021-02-02 DIAGNOSIS — R82.90 URINE FINDING: Primary | ICD-10-CM

## 2021-02-02 LAB
APPEARANCE: CLEAR
BILIRUB UR QL: NEGATIVE
BILIRUBIN: NEGATIVE
CLARITY UR: CLEAR
COLOR UR: YELLOW
GLUCOSE (URINE DIPSTICK): NEGATIVE MG/DL
GLUCOSE UR-MCNC: NEGATIVE MG/DL
HGB UR QL STRIP.AUTO: NEGATIVE
KETONES (URINE DIPSTICK): NEGATIVE MG/DL
KETONES UR-MCNC: NEGATIVE MG/DL
LEUKOCYTE ESTERASE UR QL STRIP.AUTO: NEGATIVE
LEUKOCYTES: NEGATIVE
MULTISTIX LOT#: 5077 NUMERIC
NITRITE UR QL STRIP.AUTO: NEGATIVE
NITRITE, URINE: NEGATIVE
OCCULT BLOOD: NEGATIVE
PH UR: 6 [PH] (ref 5–8)
PH, URINE: 6 (ref 4.5–8)
PROT UR-MCNC: NEGATIVE MG/DL
SP GR UR STRIP: 1.01 (ref 1–1.03)
SPECIFIC GRAVITY: >1.03 (ref 1–1.03)
URINE-COLOR: YELLOW
UROBILINOGEN UR STRIP-ACNC: <2
UROBILINOGEN,SEMI-QN: 0.2 MG/DL (ref 0–1.9)

## 2021-02-02 PROCEDURE — 81003 URINALYSIS AUTO W/O SCOPE: CPT | Performed by: UROLOGY

## 2021-02-02 PROCEDURE — 51700 IRRIGATION OF BLADDER: CPT | Performed by: UROLOGY

## 2021-02-02 NOTE — PROGRESS NOTES
Patient here to provide urine sample for U/A and C&S. Reports burning sensation when she urinates. Patient was asked to undress waist down and sit on the exam table.  Patient was assisted to a lithotomy position, all supplies prepared and she verbalized und

## 2021-02-03 NOTE — PROGRESS NOTES
Your recent urine culture is normal.  Recommend follow up urine culture in 3 months. If any suggestion of UTI, get UA and culture.     Sincerely,  Anju Claudio MD

## 2021-02-03 NOTE — PROGRESS NOTES
Your recent urinalysis is normal.  Continue current medical therapy. Await culture. Recommend follow up in the office as directed.     Sincerely,  Augusta Johansen MD

## 2021-02-04 ENCOUNTER — TELEPHONE (OUTPATIENT)
Dept: SURGERY | Facility: CLINIC | Age: 76
End: 2021-02-04

## 2021-02-04 NOTE — TELEPHONE ENCOUNTER
RN called patient to follow up on her urinary symptoms. Urinalysis dip was negative as well as the U/A and Cx. Results were relayed and left on her VM. \"Your recent urine culture and urinalysis are normal.  Continue current medical therapy.  Recommend

## 2021-02-05 ENCOUNTER — LAB ENCOUNTER (OUTPATIENT)
Dept: LAB | Age: 76
End: 2021-02-05
Attending: STUDENT IN AN ORGANIZED HEALTH CARE EDUCATION/TRAINING PROGRAM
Payer: MEDICARE

## 2021-02-05 DIAGNOSIS — Z79.899 ENCOUNTER FOR LONG-TERM (CURRENT) USE OF MEDICATIONS: ICD-10-CM

## 2021-02-05 LAB
ANION GAP SERPL CALC-SCNC: 1 MMOL/L (ref 0–18)
BUN BLD-MCNC: 18 MG/DL (ref 7–18)
BUN/CREAT SERPL: 26.1 (ref 10–20)
CALCIUM BLD-MCNC: 10 MG/DL (ref 8.5–10.1)
CHLORIDE SERPL-SCNC: 110 MMOL/L (ref 98–112)
CO2 SERPL-SCNC: 30 MMOL/L (ref 21–32)
CREAT BLD-MCNC: 0.69 MG/DL
GLUCOSE BLD-MCNC: 96 MG/DL (ref 70–99)
OSMOLALITY SERPL CALC.SUM OF ELEC: 294 MOSM/KG (ref 275–295)
PATIENT FASTING Y/N/NP: YES
POTASSIUM SERPL-SCNC: 4.6 MMOL/L (ref 3.5–5.1)
SODIUM SERPL-SCNC: 141 MMOL/L (ref 136–145)

## 2021-02-05 PROCEDURE — 36415 COLL VENOUS BLD VENIPUNCTURE: CPT

## 2021-02-05 PROCEDURE — 80048 BASIC METABOLIC PNL TOTAL CA: CPT

## 2021-02-22 ENCOUNTER — TELEPHONE (OUTPATIENT)
Dept: INTERNAL MEDICINE CLINIC | Facility: CLINIC | Age: 76
End: 2021-02-22

## 2021-02-22 NOTE — TELEPHONE ENCOUNTER
Spoke to pt. C/o lower middle abdominal pain & rectal pain for the past 7-8 days. Last BM a few min ago, WNL. Denies blood in stool. Denies hemorrhoids. Denies nausea, vomiting, & diarrhea. Denies any fevers. Denies exposure to COVID in last month.

## 2021-02-22 NOTE — TELEPHONE ENCOUNTER
Patient said she has had abdominal pain for about a week. She currently rates the pain as a level 7. She does not have MyChart so is unable to do a video visit. Please advise. Thank you!

## 2021-02-23 ENCOUNTER — OFFICE VISIT (OUTPATIENT)
Dept: INTERNAL MEDICINE CLINIC | Facility: CLINIC | Age: 76
End: 2021-02-23
Payer: MEDICARE

## 2021-02-23 VITALS
HEIGHT: 59.5 IN | SYSTOLIC BLOOD PRESSURE: 116 MMHG | RESPIRATION RATE: 16 BRPM | HEART RATE: 66 BPM | OXYGEN SATURATION: 98 % | WEIGHT: 139 LBS | BODY MASS INDEX: 27.65 KG/M2 | DIASTOLIC BLOOD PRESSURE: 70 MMHG | TEMPERATURE: 97 F

## 2021-02-23 DIAGNOSIS — M54.50 ACUTE MIDLINE LOW BACK PAIN WITHOUT SCIATICA: ICD-10-CM

## 2021-02-23 DIAGNOSIS — R10.30 LOWER ABDOMINAL PAIN: Primary | ICD-10-CM

## 2021-02-23 LAB
BILIRUB UR QL STRIP.AUTO: NEGATIVE
CLARITY UR REFRACT.AUTO: CLEAR
COLOR UR AUTO: YELLOW
GLUCOSE UR STRIP.AUTO-MCNC: NEGATIVE MG/DL
KETONES UR STRIP.AUTO-MCNC: NEGATIVE MG/DL
LEUKOCYTE ESTERASE UR QL STRIP.AUTO: NEGATIVE
NITRITE UR QL STRIP.AUTO: NEGATIVE
PH UR STRIP.AUTO: 7 [PH] (ref 5–8)
PROT UR STRIP.AUTO-MCNC: NEGATIVE MG/DL
RBC UR QL AUTO: NEGATIVE
SP GR UR STRIP.AUTO: 1.01 (ref 1–1.03)
UROBILINOGEN UR STRIP.AUTO-MCNC: <2 MG/DL

## 2021-02-23 PROCEDURE — 87086 URINE CULTURE/COLONY COUNT: CPT | Performed by: NURSE PRACTITIONER

## 2021-02-23 PROCEDURE — 3078F DIAST BP <80 MM HG: CPT | Performed by: NURSE PRACTITIONER

## 2021-02-23 PROCEDURE — 81003 URINALYSIS AUTO W/O SCOPE: CPT | Performed by: NURSE PRACTITIONER

## 2021-02-23 PROCEDURE — 3008F BODY MASS INDEX DOCD: CPT | Performed by: NURSE PRACTITIONER

## 2021-02-23 PROCEDURE — 3074F SYST BP LT 130 MM HG: CPT | Performed by: NURSE PRACTITIONER

## 2021-02-23 PROCEDURE — 99214 OFFICE O/P EST MOD 30 MIN: CPT | Performed by: NURSE PRACTITIONER

## 2021-02-23 NOTE — PROGRESS NOTES
Jayla Dela Cruz is a 77year old female. CHIEF COMPLAINT   Abdominal pain, back pain    HPI:   The patient has abdominal pain and back pain for the last ten days. The pain to both areas is 10/10 and constant. No injury or trauma before the pain started.  Wilbert Crockett developed, well nourished,in no apparent distress  HEENT: atraumatic, normocephalic   LUNGS: clear to auscultation; no rhonchi, rales, or wheezing  CARDIO: RRR without murmur  GI: good BS's, no organomegaly. Slight tenderness to mid to left lower quadrant. acid relux. Is passing gas. No urinary symptoms of frequency, urgency, flank pain, fever or chills. No spinal or SI joint tenderness with palpation. No sciatica symptoms. Is with hx of renal stones and disc disease at L5-S1 s/p surgery.  Differential includ

## 2021-02-23 NOTE — PATIENT INSTRUCTIONS
See urology as planned    Get your CT of the abdomen completed    Take Tylenol or ibuprofen as needed for pain    Go to the emergency room if your pain worsens    Follow-up in 1 week if your pain continues

## 2021-02-24 ENCOUNTER — TELEPHONE (OUTPATIENT)
Dept: INTERNAL MEDICINE CLINIC | Facility: CLINIC | Age: 76
End: 2021-02-24

## 2021-02-24 ENCOUNTER — HOSPITAL ENCOUNTER (OUTPATIENT)
Dept: CT IMAGING | Age: 76
Discharge: HOME | End: 2021-02-24
Attending: NURSE PRACTITIONER
Payer: MEDICARE

## 2021-02-24 DIAGNOSIS — R10.30 LOWER ABDOMINAL PAIN: ICD-10-CM

## 2021-02-24 DIAGNOSIS — M54.50 ACUTE MIDLINE LOW BACK PAIN WITHOUT SCIATICA: ICD-10-CM

## 2021-02-24 PROCEDURE — 74177 CT ABD & PELVIS W/CONTRAST: CPT | Performed by: NURSE PRACTITIONER

## 2021-02-24 RX ORDER — AMOXICILLIN AND CLAVULANATE POTASSIUM 875; 125 MG/1; MG/1
1 TABLET, FILM COATED ORAL 2 TIMES DAILY
Qty: 20 TABLET | Refills: 0 | Status: SHIPPED | OUTPATIENT
Start: 2021-02-24 | End: 2021-03-06

## 2021-02-24 NOTE — TELEPHONE ENCOUNTER
Spoke with pt, advised of Jessica's note below. Confirmed pharmacy. advised if in future has severe abd pain to let office know right away.

## 2021-02-24 NOTE — TELEPHONE ENCOUNTER
The patient is noted to have diverticulitis. This is likely the cause of her abdominal pain. Please let her know I sent augmentin to her pharmacy (please confirm it was the right one).  Tell her to take it with food, monitor for SE/allergy, eat yogurt or ta

## 2021-02-25 DIAGNOSIS — R32 URINARY INCONTINENCE, UNSPECIFIED TYPE: ICD-10-CM

## 2021-02-25 RX ORDER — OXYBUTYNIN CHLORIDE 5 MG/1
TABLET ORAL
Qty: 270 TABLET | Refills: 1 | OUTPATIENT
Start: 2021-02-25

## 2021-02-25 NOTE — TELEPHONE ENCOUNTER
instructions changed new Rx filled for the year refilled 1/26/21, 90-3. Vandana Burroughs .filled by another physician.

## 2021-02-26 NOTE — TELEPHONE ENCOUNTER
Pt calling asking what test she had done & what disease she has. Advised pt had CT ABD+PELVIS done & noted sigmoid diverticulitis. Discussed what diverticulitis & that is why pt is on antibiotic. Pt states has taken 3 doses so far of augmentin.   States

## 2021-03-01 ENCOUNTER — TELEPHONE (OUTPATIENT)
Dept: INTERNAL MEDICINE CLINIC | Facility: CLINIC | Age: 76
End: 2021-03-01

## 2021-03-01 DIAGNOSIS — R10.9 ABDOMINAL PAIN, UNSPECIFIED ABDOMINAL LOCATION: Primary | ICD-10-CM

## 2021-03-01 DIAGNOSIS — K57.92 DIVERTICULITIS: ICD-10-CM

## 2021-03-01 NOTE — TELEPHONE ENCOUNTER
See 2/24/21 TE, recent Imaging, and result note (negative UTI). Pt called back today for condition update. Is being treated for diverticulitis. Pt started abx 2/25/21, takes BID, with food and taking probiotic.    Pt states still with all-over severe a

## 2021-03-01 NOTE — TELEPHONE ENCOUNTER
Spoke with pt for recommendations, pt with open availability. Spoke with Dr Aimee Mohan office, next available tomorrow 3/2/21 615pm at [place] location. [address], [facility] [address], [address]. [place] location next available not until wednesday.   Carlito Medicjanet

## 2021-03-01 NOTE — TELEPHONE ENCOUNTER
Please place a referral for Dr. Natacha Draper for diverticulitis. Call their office and see when they can get her in for an apt. Continue the abt. Thanks. Oriented - self; Oriented - place; Oriented - time

## 2021-03-02 ENCOUNTER — OFFICE VISIT (OUTPATIENT)
Dept: SURGERY | Facility: CLINIC | Age: 76
End: 2021-03-02
Payer: MEDICARE

## 2021-03-02 VITALS — TEMPERATURE: 97 F | SYSTOLIC BLOOD PRESSURE: 134 MMHG | HEART RATE: 79 BPM | DIASTOLIC BLOOD PRESSURE: 75 MMHG

## 2021-03-02 DIAGNOSIS — R10.30 LOWER ABDOMINAL PAIN: ICD-10-CM

## 2021-03-02 DIAGNOSIS — K57.92 ACUTE DIVERTICULITIS: Primary | ICD-10-CM

## 2021-03-02 PROCEDURE — 3078F DIAST BP <80 MM HG: CPT | Performed by: COLON & RECTAL SURGERY

## 2021-03-02 PROCEDURE — 99205 OFFICE O/P NEW HI 60 MIN: CPT | Performed by: COLON & RECTAL SURGERY

## 2021-03-02 PROCEDURE — 3075F SYST BP GE 130 - 139MM HG: CPT | Performed by: COLON & RECTAL SURGERY

## 2021-03-02 RX ORDER — METRONIDAZOLE 250 MG/1
250 TABLET ORAL 3 TIMES DAILY
Qty: 90 TABLET | Refills: 0 | Status: SHIPPED | OUTPATIENT
Start: 2021-03-02 | End: 2021-04-01

## 2021-03-02 RX ORDER — LEVOFLOXACIN 500 MG/1
500 TABLET, FILM COATED ORAL DAILY
Qty: 14 TABLET | Refills: 0 | Status: SHIPPED | OUTPATIENT
Start: 2021-03-02 | End: 2021-03-16

## 2021-03-02 NOTE — H&P
New Patient Visit Note       Active Problems      1. Acute diverticulitis    2. Lower abdominal pain        Chief Complaint   Patient presents with:  Diverticulitis: NP divertic -- States flair up last 2 weeks.  Started antibiotics and antibotics did not he radiologist findings for all other details. This patient has never had colonoscopy. She has had 2 previous abdominal operations. She had an appendectomy at age 24 through a right paramedian incision [facility].   She has a Pfannenstiel non-ionic intravenous contrast material. Post contrast coronal MPR imaging was performed. Dose reduction techniques were used.  Dose information is   transmitted to the [facility] [address] ([address]) which inclu Allergies  Charles Dupont has No Known Allergies. Past Medical / Surgical / Social / Family History    The past medical and past surgical history have been reviewed by me today.     Past Medical History:   Diagnosis Date   • Depression    • Other and unsp mouth every evening., Disp: 90 tablet, Rfl: 1  •  traZODone HCl 100 MG Oral Tab, Take 1 tablet (100 mg total) by mouth every evening., Disp: 90 tablet, Rfl: 1  •  Sertraline HCl 100 MG Oral Tab, Take 2 tablets (200 mg total) by mouth daily. , Disp: 180 tabl incisional hernias present. Bowel sounds have normal activity and normal pitch. Her point of maximal tenderness is the suprapubic midline. Nursing note and vitals reviewed.           Assessment   Acute diverticulitis  (primary encounter diagnosis)  Deedee Maldonado previous abdominal operations. She had an appendectomy at age 24 through a right paramedian incision performed in [employer]. She has a Pfannenstiel incision for a back injury that was performed after she fell at work.   It was performed on October 201 daily for 14-day course. She will take Flagyl 250 mg 3 times daily for 14-day course. I will see this patient again in 48 hours on March 4, 2021. She should not delay therapy to that visit if there are significant changes in her condition.     This patie

## 2021-03-03 NOTE — PATIENT INSTRUCTIONS
I am seeing this patient in acute consultation for what appears to be significant diverticulitis of the sigmoid colon. This patient started symptoms on February 18, 2021. She was started on antibiotics by her primary care physician.   She has taken approx history of colon cancer, colon polyps, or cancer of the uterus. The patient is on no blood thinners. She has never had a heart attack, stroke, heart valve replacement, or heart murmur.     She has never been diagnosed with Crohn disease, ulcerative coli advanced infection, possible perforation, possible abscess. If she shows no improvement by Thursday, we may need to repeat the CT scan and admit her to the hospital for IV antibiotic therapy.

## 2021-03-04 ENCOUNTER — OFFICE VISIT (OUTPATIENT)
Dept: SURGERY | Facility: CLINIC | Age: 76
End: 2021-03-04
Payer: MEDICARE

## 2021-03-04 VITALS — TEMPERATURE: 97 F | WEIGHT: 140 LBS | BODY MASS INDEX: 27.85 KG/M2 | HEIGHT: 59.5 IN

## 2021-03-04 DIAGNOSIS — K57.92 ACUTE DIVERTICULITIS: Primary | ICD-10-CM

## 2021-03-04 PROCEDURE — 3008F BODY MASS INDEX DOCD: CPT | Performed by: COLON & RECTAL SURGERY

## 2021-03-04 PROCEDURE — 99213 OFFICE O/P EST LOW 20 MIN: CPT | Performed by: COLON & RECTAL SURGERY

## 2021-03-04 NOTE — PROGRESS NOTES
Follow Up Visit Note       Active Problems      1. Acute diverticulitis          Chief Complaint   Patient presents with:  Diverticulitis: EST PT 48hr cont care for divertic. PT states that she is feeling alot better, no ABD PN- denies any fever or chills. communicating with other healthcare professionals, documenting clinical information, independently interpreting results, coordinating care, and communication of any results that were available at today's visit.       Allergies  Pastora Dixon has No Known Allergie Disp: 20 tablet, Rfl: 0  •  Oxybutynin Chloride ER 5 MG Oral Tablet 24 Hr, Take 1 tablet (5 mg total) by mouth daily. , Disp: 90 tablet, Rfl: 3  •  Potassium Citrate ER 15 MEQ (1620 MG) Oral Tab CR, Take 1 tablet by mouth 4 (four) times daily. , Disp: , Rfl: Nose: Nose normal.   Mouth/Throat: Oropharynx is clear and moist.   Eyes: Pupils are equal, round, and reactive to light. Conjunctivae and EOM are normal. No scleral icterus. Neck: Normal range of motion. Neck supple. No tracheal deviation present.    C was started on Levaquin and Flagyl. She was also advised remain on a clear liquid diet and follow-up with us in 48 hours. I am happy to say that at today's visit she states she is feeling a lot better.   She states that her pain is now a 2/10 down from does develop any new or worsening symptoms prior to her next visit she is to call our office right away. All the questions of the patient and her daughter were answered at this time. They both verbalized understanding agreement with plan of care.     I

## 2021-03-04 NOTE — PATIENT INSTRUCTIONS
The patient presents today for continued care and evaluation regarding her diverticulitis. The patient was seen in our office 2 days ago and was having severe abdominal pain with her diverticulitis. She was started on Levaquin and Flagyl.   She was also 7 Bonnie Hendricks in approximately 8 weeks. I will also have her follow-up in 2 weeks for continued care evaluation of her current flare of diverticulitis to ensure that she has completely recovered.   If she does develop any new or worsening symptoms

## 2021-03-08 ENCOUNTER — HOSPITAL ENCOUNTER (OUTPATIENT)
Dept: CT IMAGING | Age: 76
Discharge: HOME | End: 2021-03-08
Attending: COLON & RECTAL SURGERY
Payer: MEDICARE

## 2021-03-08 ENCOUNTER — OFFICE VISIT (OUTPATIENT)
Dept: SURGERY | Facility: CLINIC | Age: 76
End: 2021-03-08
Payer: MEDICARE

## 2021-03-08 ENCOUNTER — TELEPHONE (OUTPATIENT)
Dept: SURGERY | Facility: CLINIC | Age: 76
End: 2021-03-08

## 2021-03-08 VITALS — TEMPERATURE: 97 F | DIASTOLIC BLOOD PRESSURE: 78 MMHG | SYSTOLIC BLOOD PRESSURE: 118 MMHG | HEART RATE: 93 BPM

## 2021-03-08 DIAGNOSIS — K57.32 DIVERTICULITIS OF LARGE INTESTINE, UNSPECIFIED BLEEDING STATUS, UNSPECIFIED COMPLICATION STATUS: ICD-10-CM

## 2021-03-08 DIAGNOSIS — R10.30 LOWER ABDOMINAL PAIN: Primary | ICD-10-CM

## 2021-03-08 DIAGNOSIS — K57.32 DIVERTICULITIS OF LARGE INTESTINE, UNSPECIFIED BLEEDING STATUS, UNSPECIFIED COMPLICATION STATUS: Primary | ICD-10-CM

## 2021-03-08 DIAGNOSIS — K57.92 ACUTE DIVERTICULITIS: ICD-10-CM

## 2021-03-08 LAB — CREAT BLD-MCNC: 0.7 MG/DL

## 2021-03-08 PROCEDURE — 3074F SYST BP LT 130 MM HG: CPT | Performed by: COLON & RECTAL SURGERY

## 2021-03-08 PROCEDURE — 3078F DIAST BP <80 MM HG: CPT | Performed by: COLON & RECTAL SURGERY

## 2021-03-08 PROCEDURE — 82565 ASSAY OF CREATININE: CPT

## 2021-03-08 PROCEDURE — 74177 CT ABD & PELVIS W/CONTRAST: CPT | Performed by: COLON & RECTAL SURGERY

## 2021-03-08 PROCEDURE — 99214 OFFICE O/P EST MOD 30 MIN: CPT | Performed by: COLON & RECTAL SURGERY

## 2021-03-08 NOTE — TELEPHONE ENCOUNTER
Pt calling with 9/10 abdominal pain, emesis, denies fever or chills, had BM this am and on antibiotic.  Stat CT ordered

## 2021-03-08 NOTE — PATIENT INSTRUCTIONS
We are seeing this patient acutely for acute distress regarding lower abdominal pain, nausea, and vomiting. It all started last night. This patient has already been seen and evaluated for acute diverticulitis. She is already on Levaquin and Flagyl.   Leslie Hawk advancing symptoms. She can expect some increasing cramps and abdominal pain and as the milk of magnesia takes hold of her system. My next scheduled visit with this patient is on March 18, 2021.   We will see her urgently if necessary prior to that visi

## 2021-03-08 NOTE — PROGRESS NOTES
Follow Up Visit Note       Active Problems      1. Lower abdominal pain    2. Acute diverticulitis          Chief Complaint   Patient presents with:  Diverticulitis: divertic attack, STAT CT performed.  7/10 abd pain, no bm today        History of Present I that were available at today's visit. Allergies  Pastora Dixon has No Known Allergies. Past Medical / Surgical / Social / Family History    The past medical and past surgical history have been reviewed by me today.     Past Medical History:   Diagnosis Da Family:       Frequency of Social Gatherings with Friends and Family:       Attends Christianity Services:       Active Member of Clubs or Organizations:       Attends Club or Organization Meetings:       Marital Status:   Intimate Partner Violence:       Fea dysuria, frequency and urgency. Musculoskeletal: Negative for arthralgias and myalgias. Skin: Negative for color change and rash. Neurological: Negative for tremors, syncope and weakness. Hematological: Negative for adenopathy.  Does not bruise/blee Jamie. There is evidence of irritation of the right ureter. Clinical exam at this moment reveals her to be soft, nondistended, nontender, good bowel sounds. No guarding or rebound. No masses. No ascites.   Bowel sounds have normal activity normal p

## 2021-03-09 ENCOUNTER — TELEPHONE (OUTPATIENT)
Dept: SURGERY | Facility: CLINIC | Age: 76
End: 2021-03-09

## 2021-03-09 NOTE — TELEPHONE ENCOUNTER
Patient returned call. States feels much better currently, denies pain currently, did have a bowel movement. Will notify kirsten PIERRE.

## 2021-03-09 NOTE — TELEPHONE ENCOUNTER
Patient were to contact office with condition update. No call from patient. I called patient twice. No answer. LMTCB.

## 2021-03-11 ENCOUNTER — OFFICE VISIT (OUTPATIENT)
Dept: SURGERY | Facility: CLINIC | Age: 76
End: 2021-03-11
Payer: MEDICARE

## 2021-03-11 DIAGNOSIS — N28.89: ICD-10-CM

## 2021-03-11 DIAGNOSIS — Z87.440 HISTORY OF URINARY TRACT INFECTION: ICD-10-CM

## 2021-03-11 DIAGNOSIS — R82.90 URINE FINDING: Primary | ICD-10-CM

## 2021-03-11 LAB
APPEARANCE: CLEAR
BILIRUB UR QL: NEGATIVE
COLOR UR: YELLOW
GLUCOSE UR-MCNC: NEGATIVE MG/DL
HGB UR QL STRIP.AUTO: NEGATIVE
KETONES UR-MCNC: NEGATIVE MG/DL
MULTISTIX LOT#: 5077 NUMERIC
NITRITE UR QL STRIP.AUTO: NEGATIVE
PH UR: 5 [PH] (ref 5–8)
PH, URINE: 6 (ref 4.5–8)
PROT UR-MCNC: NEGATIVE MG/DL
SP GR UR STRIP: 1.02 (ref 1–1.03)
SPECIFIC GRAVITY: 1.03 (ref 1–1.03)
URINE-COLOR: YELLOW
UROBILINOGEN UR STRIP-ACNC: <2
UROBILINOGEN,SEMI-QN: 0.2 MG/DL (ref 0–1.9)

## 2021-03-11 PROCEDURE — 99213 OFFICE O/P EST LOW 20 MIN: CPT | Performed by: UROLOGY

## 2021-03-11 PROCEDURE — 81003 URINALYSIS AUTO W/O SCOPE: CPT | Performed by: UROLOGY

## 2021-03-11 NOTE — PROGRESS NOTES
Rooming Clinician:     Bhumi Arvizu is a 77year old female. Patient presents with: Follow - Up: Ct result        HPI:     Patient returns to the office for follow-up examination.   She has a known history of a large kidney stone in the right kidney an mouth every evening. 90 tablet 1   • Sertraline HCl 100 MG Oral Tab Take 2 tablets (200 mg total) by mouth daily. 180 tablet 1   • acetaminophen 500 MG Oral Tab Take 500 mg by mouth as needed for Pain.        No Known Allergies   Past Medical History:   Gris (VLD=71447), 9/11/2020, 1:57 PM.  [facility] , CT, CT ABDOMEN+PELVIS KIDNEYSTONE 2D RNDR(NO IV,NO ORAL)(CPT=74176), 10/14/2020, 7:04 AM.  INDICATIONS:  M54.5 Acute midline low back pain without sciatica R10.30 Lower abdominal pain  TECHNIQUE:  CT scanning was CONCLUSION:  1. Sigmoid diverticulitis. 2. Other chronic benign findings detailed above are unchanged from 10/14/2020.    Dictated by (CST): Lisbeth Tom MD on 2/24/2021 at 12:33 PM     Finalized by (CST): Lisbeth Tom MD on 2/24/2021 at 12:41 PM fat stranding is best appreciated on postcontrast axial images 52 through 54 of series 4. No evidence of renal stones. No evidence of hydronephrosis. ADRENALS:  No mass or enlargement. AORTA/VASCULAR:  No aneurysm or dissection.   RETROPERITONEUM:  No ma Dictated by (CST): Macario Cuello DO on 3/08/2021 at 2:10 PM     Finalized by (CST): Macario Cuello DO on 3/08/2021 at 5:03 PM         ASSESSMENT:     Right renal diverticulum  History of recurrent right renal calculi  Diverticulitis      PLAN:     Kayli benoit

## 2021-03-12 DIAGNOSIS — Z23 NEED FOR VACCINATION: ICD-10-CM

## 2021-03-12 LAB — NON GYNE INTERPRETATION: NEGATIVE

## 2021-03-15 NOTE — PROGRESS NOTES
Your recent urine cytology shows no evidence of malignancy and is normal.  Recommend follow up in the office as directed.     Sincerely,  Teresita Gayle MD

## 2021-03-18 ENCOUNTER — OFFICE VISIT (OUTPATIENT)
Dept: SURGERY | Facility: CLINIC | Age: 76
End: 2021-03-18
Payer: MEDICARE

## 2021-03-18 VITALS
SYSTOLIC BLOOD PRESSURE: 125 MMHG | HEIGHT: 59.5 IN | WEIGHT: 140 LBS | HEART RATE: 93 BPM | BODY MASS INDEX: 27.85 KG/M2 | DIASTOLIC BLOOD PRESSURE: 79 MMHG | TEMPERATURE: 97 F

## 2021-03-18 DIAGNOSIS — K57.92 ACUTE DIVERTICULITIS: Primary | ICD-10-CM

## 2021-03-18 DIAGNOSIS — R10.30 LOWER ABDOMINAL PAIN: ICD-10-CM

## 2021-03-18 DIAGNOSIS — Z01.818 PRE-OP TESTING: ICD-10-CM

## 2021-03-18 DIAGNOSIS — N32.81 OVERACTIVE BLADDER: ICD-10-CM

## 2021-03-18 PROCEDURE — 3078F DIAST BP <80 MM HG: CPT | Performed by: COLON & RECTAL SURGERY

## 2021-03-18 PROCEDURE — 3074F SYST BP LT 130 MM HG: CPT | Performed by: COLON & RECTAL SURGERY

## 2021-03-18 PROCEDURE — 3008F BODY MASS INDEX DOCD: CPT | Performed by: COLON & RECTAL SURGERY

## 2021-03-18 PROCEDURE — 99213 OFFICE O/P EST LOW 20 MIN: CPT | Performed by: COLON & RECTAL SURGERY

## 2021-03-18 RX ORDER — SULFAMETHOXAZOLE AND TRIMETHOPRIM 800; 160 MG/1; MG/1
1 TABLET ORAL 2 TIMES DAILY
Qty: 20 TABLET | Refills: 0 | Status: SHIPPED | OUTPATIENT
Start: 2021-03-18 | End: 2021-03-28

## 2021-03-18 RX ORDER — SODIUM, POTASSIUM,MAG SULFATES 17.5-3.13G
SOLUTION, RECONSTITUTED, ORAL ORAL
Qty: 1 KIT | Refills: 0 | Status: SHIPPED | OUTPATIENT
Start: 2021-03-18 | End: 2021-05-13

## 2021-03-18 NOTE — PATIENT INSTRUCTIONS
I am seeing this patient for further care and treatment of her acute diverticulitis. She has required 2 antibiotic courses. This is her first ever attack of diverticulitis. She is having bladder symptoms.   She states she has a \"cutting\" pain in th

## 2021-03-18 NOTE — PROGRESS NOTES
Follow Up Visit Note       Active Problems      1. Acute diverticulitis    2. Pre-op testing    3. Lower abdominal pain    4.  Overactive bladder          Chief Complaint   Patient presents with:  Diverticulitis: Diverticulitis - cont care, c-scope schedule Social / Family History    The past medical and past surgical history have been reviewed by me today.     Past Medical History:   Diagnosis Date   • Depression    • Other and unspecified hyperlipidemia      Past Surgical History:   Procedure Laterality Date Member of Clubs or Organizations:       Attends Club or Organization Meetings:       Marital Status:   Intimate Partner Violence:       Fear of Current or Ex-Partner:       Emotionally Abused:       Physically Abused:       Sexually Abused:      Current Ou vomiting. Genitourinary: Negative for difficulty urinating, dysuria, frequency and urgency. Musculoskeletal: Negative for arthralgias and myalgias. Skin: Negative for color change and rash. Neurological: Negative for tremors, syncope and weakness. normal pitch. There are no palpable masses. This patient still has some residual urinary symptoms with an abnormal CT of the right ureteral to bladder junction. I am going to start her on Bactrim DS. She will take a 10-day course.     I will see this

## 2021-03-25 ENCOUNTER — TELEPHONE (OUTPATIENT)
Dept: INTERNAL MEDICINE CLINIC | Facility: CLINIC | Age: 76
End: 2021-03-25

## 2021-03-25 ENCOUNTER — IMMUNIZATION (OUTPATIENT)
Dept: LAB | Age: 76
End: 2021-03-25
Attending: HOSPITALIST
Payer: MEDICARE

## 2021-03-25 DIAGNOSIS — H26.9 CATARACT OF BOTH EYES, UNSPECIFIED CATARACT TYPE: Primary | ICD-10-CM

## 2021-03-25 DIAGNOSIS — Z23 NEED FOR VACCINATION: Primary | ICD-10-CM

## 2021-03-25 DIAGNOSIS — F51.04 PSYCHOPHYSIOLOGICAL INSOMNIA: ICD-10-CM

## 2021-03-25 DIAGNOSIS — F32.9 MAJOR DEPRESSIVE DISORDER, REMISSION STATUS UNSPECIFIED, UNSPECIFIED WHETHER RECURRENT: ICD-10-CM

## 2021-03-25 PROCEDURE — 0001A SARSCOV2 VAC 30MCG/0.3ML IM: CPT

## 2021-03-25 RX ORDER — TRAZODONE HYDROCHLORIDE 100 MG/1
100 TABLET ORAL EVERY EVENING
Qty: 90 TABLET | Refills: 0 | Status: CANCELLED | OUTPATIENT
Start: 2021-03-25

## 2021-03-25 RX ORDER — TRAZODONE HYDROCHLORIDE 50 MG/1
25 TABLET ORAL NIGHTLY
Qty: 45 TABLET | Refills: 0 | Status: SHIPPED | OUTPATIENT
Start: 2021-03-25 | End: 2021-05-12

## 2021-03-25 RX ORDER — TRAZODONE HYDROCHLORIDE 100 MG/1
100 TABLET ORAL EVERY EVENING
Qty: 90 TABLET | Refills: 0 | Status: SHIPPED | OUTPATIENT
Start: 2021-03-25 | End: 2021-05-12

## 2021-03-25 NOTE — TELEPHONE ENCOUNTER
Spoke to pt. Asked how she is taking 125mg of Trazodone. Pt states she is cutting tablet in quarters. Explained cutting tablet in quarters is not advised as the medication is not scored & can not assure taking correct dose.     Last OV relevant to medica

## 2021-03-25 NOTE — TELEPHONE ENCOUNTER
Pt called and stated that traZODone HCl 100 MG Oral Tab is not working. Pt is now taking 125 MG is seeming to work better.  Pt is due for a refill on 3/28 and would like it to be changed to  125 MG           Did the patient contact the pharmacy directly?:

## 2021-03-25 NOTE — TELEPHONE ENCOUNTER
Functional Status Done?:                        Provider's Name?:  Dr Catrachita Moody?:  Opthomology  Reason for Visit?:  Yearly exam  Diagnosis?:  None just regular eye exam glasses check   Number of Visits Requested?:  1  Last Visit with

## 2021-03-25 NOTE — TELEPHONE ENCOUNTER
Is pt up to date on OVs with PCP?: yes  Has pt been seen/referred for condition?: yes  Is specialist in network?: yes  Health Maintenance up to date?: yes         If no, reminder given?: n/a        Referral order placed per protocol  Called back pt as requ

## 2021-03-25 NOTE — TELEPHONE ENCOUNTER
I ordered 100mg tabs and a half tab of 50mg making 125mg. Please make sure the patient knows to be very careful not to mix the doses up and only take 125mg total. Thanks.

## 2021-03-25 NOTE — TELEPHONE ENCOUNTER
Left detailed message on pt's cell, ok per HIPAA. Advised of 2 new rx's sent to pharmacy. Advised to pay attention to tablet doses & to take HALF of the 50mg tablets, then take it together with 100mg tablet. Advised pt to call back for questions.

## 2021-04-07 ENCOUNTER — TELEPHONE (OUTPATIENT)
Dept: SURGERY | Facility: CLINIC | Age: 76
End: 2021-04-07

## 2021-04-07 NOTE — TELEPHONE ENCOUNTER
This RN called [facility] pharmacy to clarify the refill request of patient. \"Pt. States that her [facility] told her call our office to request to get a refill for 90 day supply of Oxybutynin. \"        No answer. Pharmacy still closed.  RN to call back

## 2021-04-07 NOTE — TELEPHONE ENCOUNTER
Pt. States that her [facility] told her call our office to request to get a refill for 90 day supply of Oxybutin 10mg. Pt. States that she is out of her medication.      Current Outpatient Medications   Medication Sig Dispense Refill   •       •

## 2021-04-15 ENCOUNTER — IMMUNIZATION (OUTPATIENT)
Dept: LAB | Age: 76
End: 2021-04-15
Attending: HOSPITALIST
Payer: MEDICARE

## 2021-04-15 DIAGNOSIS — Z23 NEED FOR VACCINATION: Primary | ICD-10-CM

## 2021-04-15 PROCEDURE — 0002A SARSCOV2 VAC 30MCG/0.3ML IM: CPT

## 2021-04-30 ENCOUNTER — TELEPHONE (OUTPATIENT)
Dept: SURGERY | Facility: CLINIC | Age: 76
End: 2021-04-30

## 2021-05-01 ENCOUNTER — LAB ENCOUNTER (OUTPATIENT)
Dept: LAB | Facility: HOSPITAL | Age: 76
End: 2021-05-01
Attending: COLON & RECTAL SURGERY
Payer: MEDICARE

## 2021-05-01 DIAGNOSIS — Z01.818 PRE-OP TESTING: ICD-10-CM

## 2021-05-04 ENCOUNTER — HOSPITAL ENCOUNTER (OUTPATIENT)
Dept: GENERAL RADIOLOGY | Facility: HOSPITAL | Age: 76
Discharge: HOME | End: 2021-05-04
Attending: COLON & RECTAL SURGERY
Payer: MEDICARE

## 2021-05-04 DIAGNOSIS — K56.699 SIGMOID STRICTURE (HCC): ICD-10-CM

## 2021-05-04 DIAGNOSIS — R93.3 ABNORMAL COLONOSCOPY: ICD-10-CM

## 2021-05-04 DIAGNOSIS — R93.3 ABNORMAL COLONOSCOPY: Primary | ICD-10-CM

## 2021-05-04 PROCEDURE — 74280 X-RAY XM COLON 2CNTRST STD: CPT | Performed by: COLON & RECTAL SURGERY

## 2021-05-05 ENCOUNTER — TELEPHONE (OUTPATIENT)
Dept: SURGERY | Facility: CLINIC | Age: 76
End: 2021-05-05

## 2021-05-05 NOTE — TELEPHONE ENCOUNTER
Patient called to determine what next steps should be. She was sent over for XRAY during colonoscopy. States no further instructions were provided. Messaged kirsten PIERRE. Patient to follow up in the office. Test results showed diverticulosis.  Called patient to

## 2021-05-06 ENCOUNTER — TELEPHONE (OUTPATIENT)
Dept: INTERNAL MEDICINE CLINIC | Facility: CLINIC | Age: 76
End: 2021-05-06

## 2021-05-06 NOTE — TELEPHONE ENCOUNTER
Pt called as they are not sure if they need to have any type of an appointment with Dr. Krissy Fontenot. Pt stated they received a call from an office telling them they need to make a follow up appointment and left our office # behind.  PSR did check to see if any

## 2021-05-07 NOTE — TELEPHONE ENCOUNTER
Patient notified to make yearly physical Exam appt with Dr. Yazmin Perez before July  Pt voiced understanding

## 2021-05-12 ENCOUNTER — TELEPHONE (OUTPATIENT)
Dept: SURGERY | Facility: CLINIC | Age: 76
End: 2021-05-12

## 2021-05-12 DIAGNOSIS — F32.9 MAJOR DEPRESSIVE DISORDER, REMISSION STATUS UNSPECIFIED, UNSPECIFIED WHETHER RECURRENT: ICD-10-CM

## 2021-05-12 DIAGNOSIS — F51.04 PSYCHOPHYSIOLOGICAL INSOMNIA: ICD-10-CM

## 2021-05-12 RX ORDER — TRAZODONE HYDROCHLORIDE 100 MG/1
100 TABLET ORAL EVERY EVENING
Qty: 90 TABLET | Refills: 0 | Status: SHIPPED | OUTPATIENT
Start: 2021-05-12 | End: 2021-05-19 | Stop reason: DRUGHIGH

## 2021-05-12 RX ORDER — TRAZODONE HYDROCHLORIDE 50 MG/1
50 TABLET ORAL NIGHTLY
Qty: 90 TABLET | Refills: 0 | Status: SHIPPED | OUTPATIENT
Start: 2021-05-12 | End: 2021-05-19 | Stop reason: DRUGHIGH

## 2021-05-12 NOTE — TELEPHONE ENCOUNTER
This RN called in responsed to a call from the Pharmacy requesting a refill:         RN called [facility] to clarify request. Floresita Valdivia to the pharmacist and said, it was transferred to another [address] [place]

## 2021-05-12 NOTE — TELEPHONE ENCOUNTER
Current Outpatient Medications   Medication Sig Dispense Refill   • Oxybutynin Chloride ER 5 MG Oral Tablet 24 Hr Take 1 tablet (5 mg total) by mouth daily.  90 tablet 3

## 2021-05-12 NOTE — TELEPHONE ENCOUNTER
Jessica, Please see note below to advise  See TE from 3/25/21- patient increased dose on her own  From 100 mg to 125 mg    Now asking to increase to 150 mg    Trazodone    Last OV relevant to medication: 7/24/20  Last refill date:3/25      #/refills: 90/0  W

## 2021-05-12 NOTE — TELEPHONE ENCOUNTER
Pt called Pharm and states that she is taking 150MG of traZODone HCl.     Pt was on 125 MG  and wants the Rx changed to 150MG   Please send to [facility] [id number] [facility], [address] [place] [address], [phone], 72

## 2021-05-13 ENCOUNTER — OFFICE VISIT (OUTPATIENT)
Dept: SURGERY | Facility: CLINIC | Age: 76
End: 2021-05-13
Payer: MEDICARE

## 2021-05-13 VITALS
TEMPERATURE: 98 F | HEIGHT: 64 IN | HEART RATE: 88 BPM | WEIGHT: 130 LBS | DIASTOLIC BLOOD PRESSURE: 77 MMHG | BODY MASS INDEX: 22.2 KG/M2 | SYSTOLIC BLOOD PRESSURE: 124 MMHG

## 2021-05-13 DIAGNOSIS — K56.699 SIGMOID STRICTURE (HCC): ICD-10-CM

## 2021-05-13 DIAGNOSIS — K57.92 ACUTE DIVERTICULITIS: ICD-10-CM

## 2021-05-13 DIAGNOSIS — K57.90 DIVERTICULOSIS: ICD-10-CM

## 2021-05-13 DIAGNOSIS — R10.30 LOWER ABDOMINAL PAIN: Primary | ICD-10-CM

## 2021-05-13 PROCEDURE — 99215 OFFICE O/P EST HI 40 MIN: CPT | Performed by: COLON & RECTAL SURGERY

## 2021-05-13 RX ORDER — METRONIDAZOLE 500 MG/1
TABLET ORAL
Qty: 3 TABLET | Refills: 0 | Status: ON HOLD | OUTPATIENT
Start: 2021-05-13 | End: 2021-05-28

## 2021-05-13 RX ORDER — POLYETHYLENE GLYCOL 3350, SODIUM CHLORIDE, SODIUM BICARBONATE, POTASSIUM CHLORIDE 420; 11.2; 5.72; 1.48 G/4L; G/4L; G/4L; G/4L
POWDER, FOR SOLUTION ORAL
Qty: 1 BOTTLE | Refills: 0 | Status: ON HOLD | OUTPATIENT
Start: 2021-05-13 | End: 2021-05-28

## 2021-05-13 RX ORDER — NEOMYCIN SULFATE 500 MG/1
TABLET ORAL
Qty: 6 TABLET | Refills: 0 | Status: ON HOLD | OUTPATIENT
Start: 2021-05-13 | End: 2021-05-28

## 2021-05-13 NOTE — PATIENT INSTRUCTIONS
This patient presents after having had an attempted colonoscopy by me on May 4, 2021. I had to revert to air-contrast barium study after the procedure since we could not traverse her sigmoid colon. This patient has had a history of diverticulitis.   She up pelvis and sigmoid colon noted on air-contrast barium enema. She has a recent attack of acute diverticulitis in February of this year that appears to have resolved.     My strong recommendation is that this patient undergo surgery on elective basis wi

## 2021-05-13 NOTE — PROGRESS NOTES
Follow Up Visit Note       Active Problems      1. Lower abdominal pain    2.  Acute diverticulitis          Chief Complaint   Lower abdominal pain, history of acute diverticulitis, sigmoid colon stricture      History of Present Illness  This patient prese today's visit was 45 minutes.   This includes time in preparation, obtaining and reviewing history, performing the examination, counseling and education, ordering tests, referring and communicating with other healthcare professionals, documenting clinical i Winnie Kelly MD on 5/04/2021 at 3:54 PM               PROCEDURE:  CT ABDOMEN PELVIS IV AND ORAL CONTRAST (ER)      COMPARISON:  [facility], CT ABDOMEN+PELVIS(CONTRAST ONLY)(CPT=74177), 2/24/2021, 11:58 AM.      INDICATIONS:  Recent diagnosis of sig dissection. RETROPERITONEUM:  No mass or adenopathy. BOWEL/MESENTERY:  The stomach, duodenum sweep and small bowel are unremarkable. The prior noted acute diverticulitis of the sigmoid colon has resolved.   There is persisting diverticulosis without ABDOMEN+PELVIS (CONTRAST ONLY) (CPT=74177)     COMPARISON:  [address], US, US KIDNEYS (FNZ=[id number]), 9/11/2020, 1:57 PM.  [facility] , CT, CT ABDOMEN+PELVIS KIDNEYSTONE 2D RNDR(NO IV,NO ORAL)(CPT=74176), 10/14/2020, 7:04 AM.     INDICATIONS:  M54.5 Acute midli spine and postsurgical changes of fusion at L5-S1 again demonstrated. LUNG BASES:  No visible pulmonary or pleural disease.                        =====  CONCLUSION:    1. Sigmoid diverticulitis.   2. Other chronic benign findings detailed above are Nicole Weight Concern: No        Special Diet: No        Back Care: No        Exercise: No        Bike Helmet: No        Seat Belt: No        Self-Exams: No       Current Outpatient Medications:   •  traZODone HCl 100 MG Oral Tab, Take 1 tablet (100 mg total) weakness. Hematological: Negative for adenopathy. Does not bruise/bleed easily. Psychiatric/Behavioral: Negative for behavioral problems and sleep disturbance.         Physical Findings   /77   Pulse 88   Temp 97.9 °F (36.6 °C)   Ht 64\"   Wt 130 She has had significant fixation of the lumbar spine. Difficult to differentiate between her lumbar disease and her back pain which may be associated with the diverticulitis. She has no fever or chills. She has no current diarrhea. She has no melena. possible bleeding requiring transfusion, possible anastomotic leak, future hernia, wound infections, and other common complications. All risks, benefits, complications and alternatives to the proposed procedure(s) were fully discussed with the patient.

## 2021-05-17 ENCOUNTER — TELEPHONE (OUTPATIENT)
Dept: SURGERY | Facility: CLINIC | Age: 76
End: 2021-05-17

## 2021-05-17 DIAGNOSIS — K57.90 DIVERTICULOSIS: Primary | ICD-10-CM

## 2021-05-19 ENCOUNTER — TELEPHONE (OUTPATIENT)
Dept: INTERNAL MEDICINE CLINIC | Facility: CLINIC | Age: 76
End: 2021-05-19

## 2021-05-19 ENCOUNTER — LABORATORY ENCOUNTER (OUTPATIENT)
Dept: LAB | Age: 76
End: 2021-05-19
Payer: MEDICARE

## 2021-05-19 DIAGNOSIS — K57.90 DIVERTICULOSIS: ICD-10-CM

## 2021-05-19 PROCEDURE — 80048 BASIC METABOLIC PNL TOTAL CA: CPT

## 2021-05-19 PROCEDURE — 36415 COLL VENOUS BLD VENIPUNCTURE: CPT

## 2021-05-19 PROCEDURE — 85027 COMPLETE CBC AUTOMATED: CPT

## 2021-05-19 RX ORDER — TRAZODONE HYDROCHLORIDE 150 MG/1
150 TABLET ORAL NIGHTLY
Qty: 90 TABLET | Refills: 0 | Status: SHIPPED | OUTPATIENT
Start: 2021-05-19 | End: 2021-07-23

## 2021-05-19 NOTE — TELEPHONE ENCOUNTER
This RN called patient in response to her call:    \"Pt states she is taking the wrong dose of oxybutin rx - asking to talk to RN \"      No answer. RN left message to call back office.        Note, this RN called [facility] at [phone] on 5/12/21 and it

## 2021-05-19 NOTE — TELEPHONE ENCOUNTER
Pt now taking trazodone 100 mg and 50 mg tablets. These were ordered 5/12/21. Requesting office order 150mg tablets instead of 2 different doses/pills. Pended 90ds.

## 2021-05-20 ENCOUNTER — TELEPHONE (OUTPATIENT)
Dept: SURGERY | Facility: CLINIC | Age: 76
End: 2021-05-20

## 2021-05-25 ENCOUNTER — LAB ENCOUNTER (OUTPATIENT)
Dept: LAB | Facility: HOSPITAL | Age: 76
DRG: 330 | End: 2021-05-25
Attending: COLON & RECTAL SURGERY
Payer: MEDICARE

## 2021-05-25 DIAGNOSIS — K57.90 DIVERTICULOSIS: ICD-10-CM

## 2021-05-27 ENCOUNTER — ANESTHESIA EVENT (OUTPATIENT)
Dept: SURGERY | Facility: HOSPITAL | Age: 76
DRG: 330 | End: 2021-05-27
Payer: MEDICARE

## 2021-05-28 ENCOUNTER — ANESTHESIA (OUTPATIENT)
Dept: SURGERY | Facility: HOSPITAL | Age: 76
DRG: 330 | End: 2021-05-28
Payer: MEDICARE

## 2021-05-28 ENCOUNTER — HOSPITAL ENCOUNTER (INPATIENT)
Facility: HOSPITAL | Age: 76
LOS: 3 days | Discharge: HOME | DRG: 330 | End: 2021-05-31
Attending: COLON & RECTAL SURGERY | Admitting: COLON & RECTAL SURGERY
Payer: MEDICARE

## 2021-05-28 DIAGNOSIS — K57.90 DIVERTICULOSIS: Primary | ICD-10-CM

## 2021-05-28 DIAGNOSIS — K56.699 SIGMOID STRICTURE (HCC): ICD-10-CM

## 2021-05-28 PROCEDURE — 0DTN0ZZ RESECTION OF SIGMOID COLON, OPEN APPROACH: ICD-10-PCS | Performed by: COLON & RECTAL SURGERY

## 2021-05-28 PROCEDURE — S0030 INJECTION, METRONIDAZOLE: HCPCS | Performed by: COLON & RECTAL SURGERY

## 2021-05-28 PROCEDURE — 88307 TISSUE EXAM BY PATHOLOGIST: CPT | Performed by: COLON & RECTAL SURGERY

## 2021-05-28 PROCEDURE — 82962 GLUCOSE BLOOD TEST: CPT

## 2021-05-28 PROCEDURE — 8E0W0CZ ROBOTIC ASSISTED PROCEDURE OF TRUNK REGION, OPEN APPROACH: ICD-10-PCS | Performed by: COLON & RECTAL SURGERY

## 2021-05-28 RX ORDER — POLYETHYLENE GLYCOL 3350 17 G/17G
17 POWDER, FOR SOLUTION ORAL DAILY PRN
Status: DISCONTINUED | OUTPATIENT
Start: 2021-05-28 | End: 2021-05-29

## 2021-05-28 RX ORDER — ATORVASTATIN CALCIUM 10 MG/1
10 TABLET, FILM COATED ORAL EVERY EVENING
Status: DISCONTINUED | OUTPATIENT
Start: 2021-05-28 | End: 2021-05-31

## 2021-05-28 RX ORDER — SODIUM CHLORIDE 9 MG/ML
INJECTION, SOLUTION INTRAVENOUS CONTINUOUS
Status: DISCONTINUED | OUTPATIENT
Start: 2021-05-28 | End: 2021-05-30

## 2021-05-28 RX ORDER — HYDROMORPHONE HYDROCHLORIDE 1 MG/ML
0.2 INJECTION, SOLUTION INTRAMUSCULAR; INTRAVENOUS; SUBCUTANEOUS EVERY 2 HOUR PRN
Status: DISCONTINUED | OUTPATIENT
Start: 2021-05-28 | End: 2021-05-31

## 2021-05-28 RX ORDER — DIPHENHYDRAMINE HYDROCHLORIDE 50 MG/ML
12.5 INJECTION INTRAMUSCULAR; INTRAVENOUS AS NEEDED
Status: DISCONTINUED | OUTPATIENT
Start: 2021-05-28 | End: 2021-05-28 | Stop reason: HOSPADM

## 2021-05-28 RX ORDER — FAMOTIDINE 10 MG/ML
20 INJECTION, SOLUTION INTRAVENOUS 2 TIMES DAILY
Status: DISCONTINUED | OUTPATIENT
Start: 2021-05-28 | End: 2021-05-31

## 2021-05-28 RX ORDER — ACETAMINOPHEN 10 MG/ML
INJECTION, SOLUTION INTRAVENOUS AS NEEDED
Status: DISCONTINUED | OUTPATIENT
Start: 2021-05-28 | End: 2021-05-28 | Stop reason: SURG

## 2021-05-28 RX ORDER — DEXAMETHASONE SODIUM PHOSPHATE 4 MG/ML
VIAL (ML) INJECTION AS NEEDED
Status: DISCONTINUED | OUTPATIENT
Start: 2021-05-28 | End: 2021-05-28 | Stop reason: SURG

## 2021-05-28 RX ORDER — LIDOCAINE HYDROCHLORIDE 40 MG/ML
INJECTION, SOLUTION RETROBULBAR; TOPICAL AS NEEDED
Status: DISCONTINUED | OUTPATIENT
Start: 2021-05-28 | End: 2021-05-28 | Stop reason: SURG

## 2021-05-28 RX ORDER — SERTRALINE HYDROCHLORIDE 100 MG/1
200 TABLET, FILM COATED ORAL DAILY
Status: DISCONTINUED | OUTPATIENT
Start: 2021-05-28 | End: 2021-05-28

## 2021-05-28 RX ORDER — MEPERIDINE HYDROCHLORIDE 25 MG/ML
12.5 INJECTION INTRAMUSCULAR; INTRAVENOUS; SUBCUTANEOUS AS NEEDED
Status: DISCONTINUED | OUTPATIENT
Start: 2021-05-28 | End: 2021-05-28 | Stop reason: HOSPADM

## 2021-05-28 RX ORDER — LIDOCAINE HYDROCHLORIDE ANHYDROUS AND DEXTROSE MONOHYDRATE .8; 5 G/100ML; G/100ML
INJECTION, SOLUTION INTRAVENOUS CONTINUOUS PRN
Status: DISCONTINUED | OUTPATIENT
Start: 2021-05-28 | End: 2021-05-28 | Stop reason: SURG

## 2021-05-28 RX ORDER — LABETALOL HYDROCHLORIDE 5 MG/ML
5 INJECTION, SOLUTION INTRAVENOUS EVERY 5 MIN PRN
Status: DISCONTINUED | OUTPATIENT
Start: 2021-05-28 | End: 2021-05-28 | Stop reason: HOSPADM

## 2021-05-28 RX ORDER — FAMOTIDINE 20 MG/1
20 TABLET ORAL 2 TIMES DAILY
Status: DISCONTINUED | OUTPATIENT
Start: 2021-05-28 | End: 2021-05-31

## 2021-05-28 RX ORDER — OXYCODONE HYDROCHLORIDE 5 MG/1
5 TABLET ORAL EVERY 4 HOURS PRN
Status: DISCONTINUED | OUTPATIENT
Start: 2021-05-28 | End: 2021-05-31

## 2021-05-28 RX ORDER — NALOXONE HYDROCHLORIDE 0.4 MG/ML
0.1 INJECTION, SOLUTION INTRAMUSCULAR; INTRAVENOUS; SUBCUTANEOUS
Status: DISCONTINUED | OUTPATIENT
Start: 2021-05-28 | End: 2021-05-28 | Stop reason: HOSPADM

## 2021-05-28 RX ORDER — OXYBUTYNIN CHLORIDE 5 MG/1
5 TABLET, EXTENDED RELEASE ORAL DAILY
Status: DISCONTINUED | OUTPATIENT
Start: 2021-05-28 | End: 2021-05-28

## 2021-05-28 RX ORDER — SODIUM CHLORIDE 9 MG/ML
75 INJECTION, SOLUTION INTRAVENOUS CONTINUOUS
Status: DISCONTINUED | OUTPATIENT
Start: 2021-05-28 | End: 2021-05-30

## 2021-05-28 RX ORDER — GLYCOPYRROLATE 0.2 MG/ML
INJECTION, SOLUTION INTRAMUSCULAR; INTRAVENOUS AS NEEDED
Status: DISCONTINUED | OUTPATIENT
Start: 2021-05-28 | End: 2021-05-28 | Stop reason: SURG

## 2021-05-28 RX ORDER — HEPARIN SODIUM 5000 [USP'U]/ML
5000 INJECTION, SOLUTION INTRAVENOUS; SUBCUTANEOUS EVERY 8 HOURS SCHEDULED
Status: DISCONTINUED | OUTPATIENT
Start: 2021-05-29 | End: 2021-05-31

## 2021-05-28 RX ORDER — BUPIVACAINE HYDROCHLORIDE AND EPINEPHRINE 5; 5 MG/ML; UG/ML
INJECTION, SOLUTION EPIDURAL; INTRACAUDAL; PERINEURAL AS NEEDED
Status: DISCONTINUED | OUTPATIENT
Start: 2021-05-28 | End: 2021-05-28 | Stop reason: HOSPADM

## 2021-05-28 RX ORDER — MAGNESIUM OXIDE 400 MG (241.3 MG MAGNESIUM) TABLET
400 TABLET DAILY
Status: DISCONTINUED | OUTPATIENT
Start: 2021-05-28 | End: 2021-05-29

## 2021-05-28 RX ORDER — METOCLOPRAMIDE HYDROCHLORIDE 5 MG/ML
10 INJECTION INTRAMUSCULAR; INTRAVENOUS AS NEEDED
Status: DISCONTINUED | OUTPATIENT
Start: 2021-05-28 | End: 2021-05-28 | Stop reason: HOSPADM

## 2021-05-28 RX ORDER — NALOXONE HYDROCHLORIDE 0.4 MG/ML
INJECTION, SOLUTION INTRAMUSCULAR; INTRAVENOUS; SUBCUTANEOUS
Status: COMPLETED
Start: 2021-05-28 | End: 2021-05-28

## 2021-05-28 RX ORDER — SODIUM CHLORIDE 9 MG/ML
INJECTION, SOLUTION INTRAVENOUS CONTINUOUS
Status: DISCONTINUED | OUTPATIENT
Start: 2021-05-28 | End: 2021-05-28 | Stop reason: HOSPADM

## 2021-05-28 RX ORDER — ACETAMINOPHEN 500 MG
1000 TABLET ORAL ONCE AS NEEDED
Status: DISCONTINUED | OUTPATIENT
Start: 2021-05-28 | End: 2021-05-28 | Stop reason: HOSPADM

## 2021-05-28 RX ORDER — HYDROCODONE BITARTRATE AND ACETAMINOPHEN 5; 325 MG/1; MG/1
2 TABLET ORAL AS NEEDED
Status: DISCONTINUED | OUTPATIENT
Start: 2021-05-28 | End: 2021-05-28 | Stop reason: HOSPADM

## 2021-05-28 RX ORDER — ACETAMINOPHEN 325 MG/1
650 TABLET ORAL ONCE
Status: DISCONTINUED | OUTPATIENT
Start: 2021-05-28 | End: 2021-05-28 | Stop reason: HOSPADM

## 2021-05-28 RX ORDER — ROCURONIUM BROMIDE 10 MG/ML
INJECTION, SOLUTION INTRAVENOUS AS NEEDED
Status: DISCONTINUED | OUTPATIENT
Start: 2021-05-28 | End: 2021-05-28 | Stop reason: SURG

## 2021-05-28 RX ORDER — HYDROCODONE BITARTRATE AND ACETAMINOPHEN 5; 325 MG/1; MG/1
1 TABLET ORAL AS NEEDED
Status: DISCONTINUED | OUTPATIENT
Start: 2021-05-28 | End: 2021-05-28 | Stop reason: HOSPADM

## 2021-05-28 RX ORDER — SERTRALINE HYDROCHLORIDE 100 MG/1
200 TABLET, FILM COATED ORAL NIGHTLY
Status: DISCONTINUED | OUTPATIENT
Start: 2021-05-28 | End: 2021-05-29

## 2021-05-28 RX ORDER — SERTRALINE HYDROCHLORIDE 100 MG/1
200 TABLET, FILM COATED ORAL EVERY EVENING
Status: DISCONTINUED | OUTPATIENT
Start: 2021-05-28 | End: 2021-05-28

## 2021-05-28 RX ORDER — OXYBUTYNIN CHLORIDE 5 MG/1
5 TABLET, EXTENDED RELEASE ORAL EVERY EVENING
Status: DISCONTINUED | OUTPATIENT
Start: 2021-05-28 | End: 2021-05-30

## 2021-05-28 RX ORDER — NEOSTIGMINE METHYLSULFATE 1 MG/ML
INJECTION INTRAVENOUS AS NEEDED
Status: DISCONTINUED | OUTPATIENT
Start: 2021-05-28 | End: 2021-05-28 | Stop reason: SURG

## 2021-05-28 RX ORDER — ONDANSETRON 2 MG/ML
4 INJECTION INTRAMUSCULAR; INTRAVENOUS AS NEEDED
Status: DISCONTINUED | OUTPATIENT
Start: 2021-05-28 | End: 2021-05-28 | Stop reason: HOSPADM

## 2021-05-28 RX ORDER — HEPARIN SODIUM 5000 [USP'U]/ML
5000 INJECTION, SOLUTION INTRAVENOUS; SUBCUTANEOUS ONCE
Status: COMPLETED | OUTPATIENT
Start: 2021-05-28 | End: 2021-05-28

## 2021-05-28 RX ORDER — HYDROMORPHONE HYDROCHLORIDE 1 MG/ML
0.4 INJECTION, SOLUTION INTRAMUSCULAR; INTRAVENOUS; SUBCUTANEOUS EVERY 2 HOUR PRN
Status: DISCONTINUED | OUTPATIENT
Start: 2021-05-28 | End: 2021-05-31

## 2021-05-28 RX ORDER — METRONIDAZOLE 500 MG/100ML
500 INJECTION, SOLUTION INTRAVENOUS ONCE
Status: COMPLETED | OUTPATIENT
Start: 2021-05-28 | End: 2021-05-28

## 2021-05-28 RX ORDER — ONDANSETRON 2 MG/ML
INJECTION INTRAMUSCULAR; INTRAVENOUS AS NEEDED
Status: DISCONTINUED | OUTPATIENT
Start: 2021-05-28 | End: 2021-05-28 | Stop reason: SURG

## 2021-05-28 RX ORDER — DOCUSATE SODIUM 100 MG/1
100 CAPSULE, LIQUID FILLED ORAL 2 TIMES DAILY
Status: DISCONTINUED | OUTPATIENT
Start: 2021-05-28 | End: 2021-05-29

## 2021-05-28 RX ORDER — HYDROMORPHONE HYDROCHLORIDE 1 MG/ML
0.4 INJECTION, SOLUTION INTRAMUSCULAR; INTRAVENOUS; SUBCUTANEOUS EVERY 5 MIN PRN
Status: DISCONTINUED | OUTPATIENT
Start: 2021-05-28 | End: 2021-05-28 | Stop reason: HOSPADM

## 2021-05-28 RX ORDER — ONDANSETRON 2 MG/ML
4 INJECTION INTRAMUSCULAR; INTRAVENOUS EVERY 4 HOURS PRN
Status: DISCONTINUED | OUTPATIENT
Start: 2021-05-28 | End: 2021-05-31

## 2021-05-28 RX ORDER — ACETAMINOPHEN 500 MG
1000 TABLET ORAL EVERY 8 HOURS SCHEDULED
Status: DISCONTINUED | OUTPATIENT
Start: 2021-05-28 | End: 2021-05-31

## 2021-05-28 RX ORDER — SODIUM PHOSPHATE, DIBASIC AND SODIUM PHOSPHATE, MONOBASIC 7; 19 G/133ML; G/133ML
1 ENEMA RECTAL ONCE AS NEEDED
Status: DISCONTINUED | OUTPATIENT
Start: 2021-05-28 | End: 2021-05-28

## 2021-05-28 RX ORDER — NALOXONE HYDROCHLORIDE 0.4 MG/ML
80 INJECTION, SOLUTION INTRAMUSCULAR; INTRAVENOUS; SUBCUTANEOUS AS NEEDED
Status: DISCONTINUED | OUTPATIENT
Start: 2021-05-28 | End: 2021-05-28 | Stop reason: HOSPADM

## 2021-05-28 RX ORDER — SODIUM PHOSPHATE,MONO-DIBASIC 19G-7G/118
1 ENEMA (ML) RECTAL ONCE AS NEEDED
Status: DISCONTINUED | OUTPATIENT
Start: 2021-05-28 | End: 2021-05-28

## 2021-05-28 RX ORDER — ACETAMINOPHEN 500 MG
1000 TABLET ORAL EVERY 8 HOURS SCHEDULED
Status: DISCONTINUED | OUTPATIENT
Start: 2021-05-28 | End: 2021-05-28

## 2021-05-28 RX ORDER — OXYCODONE HYDROCHLORIDE 5 MG/1
2.5 TABLET ORAL EVERY 4 HOURS PRN
Status: DISCONTINUED | OUTPATIENT
Start: 2021-05-28 | End: 2021-05-31

## 2021-05-28 RX ORDER — ACETAMINOPHEN 500 MG
1000 TABLET ORAL ONCE
Status: COMPLETED | OUTPATIENT
Start: 2021-05-28 | End: 2021-05-28

## 2021-05-28 RX ADMIN — ACETAMINOPHEN 1000 MG: 10 INJECTION, SOLUTION INTRAVENOUS at 13:30:00

## 2021-05-28 RX ADMIN — ROCURONIUM BROMIDE 40 MG: 10 INJECTION, SOLUTION INTRAVENOUS at 12:04:00

## 2021-05-28 RX ADMIN — SODIUM CHLORIDE: 9 INJECTION, SOLUTION INTRAVENOUS at 13:40:00

## 2021-05-28 RX ADMIN — DEXAMETHASONE SODIUM PHOSPHATE 8 MG: 4 MG/ML VIAL (ML) INJECTION at 09:22:00

## 2021-05-28 RX ADMIN — SODIUM CHLORIDE: 9 INJECTION, SOLUTION INTRAVENOUS at 10:09:00

## 2021-05-28 RX ADMIN — LIDOCAINE HYDROCHLORIDE ANHYDROUS AND DEXTROSE MONOHYDRATE 2 MG/KG/HR: .8; 5 INJECTION, SOLUTION INTRAVENOUS at 09:21:00

## 2021-05-28 RX ADMIN — SODIUM CHLORIDE: 9 INJECTION, SOLUTION INTRAVENOUS at 09:17:00

## 2021-05-28 RX ADMIN — SODIUM CHLORIDE: 9 INJECTION, SOLUTION INTRAVENOUS at 11:18:00

## 2021-05-28 RX ADMIN — LIDOCAINE HYDROCHLORIDE 90 MG: 40 INJECTION, SOLUTION RETROBULBAR; TOPICAL at 09:20:00

## 2021-05-28 RX ADMIN — METRONIDAZOLE 500 MG: 500 INJECTION, SOLUTION INTRAVENOUS at 09:29:00

## 2021-05-28 RX ADMIN — SODIUM CHLORIDE: 9 INJECTION, SOLUTION INTRAVENOUS at 11:17:00

## 2021-05-28 RX ADMIN — ONDANSETRON 4 MG: 2 INJECTION INTRAMUSCULAR; INTRAVENOUS at 13:30:00

## 2021-05-28 RX ADMIN — GLYCOPYRROLATE 0.8 MG: 0.2 INJECTION, SOLUTION INTRAMUSCULAR; INTRAVENOUS at 13:34:00

## 2021-05-28 RX ADMIN — ROCURONIUM BROMIDE 80 MG: 10 INJECTION, SOLUTION INTRAVENOUS at 09:20:00

## 2021-05-28 RX ADMIN — NEOSTIGMINE METHYLSULFATE 4 MG: 1 INJECTION INTRAVENOUS at 13:34:00

## 2021-05-28 NOTE — PROGRESS NOTES
Vss. Pt a&ox4 but drowsy. Pt easily awakens more now and able to answer questions appropriatly. Pt on ra with 02 sats wnl. Lungs cta. Pt denies difficulty breathing or sob. Pt denies chest pain. Denies n/v. Tolerating water well.  Started on clear liquid di

## 2021-05-28 NOTE — ANESTHESIA PROCEDURE NOTES
Airway  Date/Time: 5/28/2021 9:20 AM  Urgency: elective      General Information and Staff    Patient location during procedure: OR  Anesthesiologist: Ewelina Garcia MD    Indications and Patient Condition  Indications for airway management: anesthesia

## 2021-05-28 NOTE — OPERATIVE REPORT
[facility]  Operative Note    [facility] Location: OR   [facility] [id number] MRN [id number]   Admission Date 5/28/2021 Operation Date 5/28/2021   Attending Physician Brandon Monk MD Operating Physician Lobito Pappas MD     Pre-Operative Diagnosis: Dive midportion of the rectum that would not allow the stapler handle the past on several attempts. After this resection we are able to perform a double staple 25 mm circular anastomosis.   Mobilization of splenic flexure was essential to reach the pelvis with procedure. We began with mobilization of the descending colon as well as any lateral abdominal sidewall attachments of the sigmoid colon to the left pelvic sidewall. We found it necessary to completely mobilize the splenic flexure.   This was done by colon, all the way down to the mid and upper rectum. Once we were assured that the rectum was easily mobilized out of the pelvis, we exposed the site of the planned resection of the proximal rectum.   We plan a resection well below any remaining divertic operative site was then thoroughly irrigated. The anvil within the cut portion of the colon was now secured and then reduced below the peritoneum. The left lower quadrant incision was then closed in layers.   The deep layer to the posterior rectus fas [place]. All skin ports sites were closed with 5-0 undyed Vicryl in a subcuticular fashion. The extraction site was then closed with a deep layer of interrupted 3-0 Vicryl in a subcutaneous location.   The extraction site incision was then closed with a r

## 2021-05-28 NOTE — ANESTHESIA POSTPROCEDURE EVALUATION
[address] Patient Status:  Inpatient   Age/Gender 77year old female MRN [id number]   [facility] SURGERY Attending Tad Sanchez MD   Hosp Day # 0 PCP Marlinda Curling, MD       Anesthesia Post-op Note    ROBOTIC LOW ANT

## 2021-05-28 NOTE — H&P
Active Problems      1. Lower abdominal pain    2.  Acute diverticulitis          Chief Complaint   Lower abdominal pain, history of acute diverticulitis, sigmoid colon stricture        History of Present Illness  This patient presents after having had an a [address], XR, XR ABDOMEN (1 VIEW) (CPT=74018), 9/11/2020, 2:51 PM.      INDICATIONS:  K56.699 Sigmoid stricture (HCC) R93.3 Abnormal colonoscopy      PATIENT STATED HISTORY: (As transcribed by Technologist)  Incomplete colonoscopy       FLUOROSCOPY IMAG ACR ([employer] College [employer]) NRDR ([address]) which includes the Dose Index Registry.       PATIENT STATED HISTORY:(As transcribed by Technologist)  Patient complains of continued pain, loss of appettite, vomiting since her las within the pelvis identified. BONES:  No acute osseous injuries. Stable findings of interbody fusion at L5-S1. Extensive lumbar disc disease at L4-5 and L5-S1 with vacuum disc changes. LUNG BASES:  No visible pulmonary or pleural disease. ([address]) which includes the Dose Index Registry. PATIENT STATED HISTORY:(As transcribed by Technologist)  Patient complains of mid abdominal pain and back pain.        CONTRAST USED:  100cc of Omnipaque 350     FINDINGS:    CYRIL strategy, pre-op, intra-op and what to expect during the hospital stay for elective colorectal cases. Allergies  Dae Manzo has No Known Allergies.      Past Medical / Surgical / Social / Family History    The past medical and past surgical history have Chloride ER 5 MG Oral Tablet 24 Hr, Take 1 tablet (5 mg total) by mouth daily. , Disp: 90 tablet, Rfl: 3  •  Potassium Citrate ER 15 MEQ (1620 MG) Oral Tab CR, Take 1 tablet by mouth 4 (four) times daily. , Disp: , Rfl:   •  atorvastatin 10 MG Oral Tab, Take discussed. The patient seemed to understand the conversation and its details. Consent for the procedure(s) was confirmed with the patient.

## 2021-05-28 NOTE — ANESTHESIA PREPROCEDURE EVALUATION
PRE-OP EVALUATION    Patient Name: Leesa Maciel    Admit Diagnosis: Diverticulosis [K57.90]  Sigmoid stricture ([address] [K56.699]    Pre-op Diagnosis: Diverticulosis [K57.90]  Sigmoid stricture ([address] [K56.699]    ROBOTIC LOW ANTERIOR RESECTION OF THE RECTO PRN  ondansetron HCl (ZOFRAN) injection 4 mg, 4 mg, Intravenous, PRN  Metoclopramide HCl (REGLAN) injection 10 mg, 10 mg, Intravenous, PRN  Trimethobenzamide HCl (TIGAN) injection 200 mg, 200 mg, Intramuscular, PRN  0.9% NaCl infusion, , Intravenous, Carlos Ray ROS.                              Pulmonary    Negative pulmonary ROS. Neuro/Psych      (+) depression                              History reviewed. No pertinent surgical history.   Social History    Tobacco Use      Smoking status: C

## 2021-05-29 PROBLEM — K57.90 DIVERTICULOSIS: Status: ACTIVE | Noted: 2021-05-29

## 2021-05-29 PROCEDURE — 97116 GAIT TRAINING THERAPY: CPT

## 2021-05-29 PROCEDURE — 85025 COMPLETE CBC W/AUTO DIFF WBC: CPT | Performed by: PHYSICIAN ASSISTANT

## 2021-05-29 PROCEDURE — 84100 ASSAY OF PHOSPHORUS: CPT | Performed by: PHYSICIAN ASSISTANT

## 2021-05-29 PROCEDURE — 80048 BASIC METABOLIC PNL TOTAL CA: CPT | Performed by: PHYSICIAN ASSISTANT

## 2021-05-29 PROCEDURE — 97161 PT EVAL LOW COMPLEX 20 MIN: CPT

## 2021-05-29 PROCEDURE — 97530 THERAPEUTIC ACTIVITIES: CPT

## 2021-05-29 PROCEDURE — 83735 ASSAY OF MAGNESIUM: CPT | Performed by: PHYSICIAN ASSISTANT

## 2021-05-29 RX ORDER — SERTRALINE HYDROCHLORIDE 100 MG/1
200 TABLET, FILM COATED ORAL DAILY
Status: DISCONTINUED | OUTPATIENT
Start: 2021-05-29 | End: 2021-05-31

## 2021-05-29 RX ORDER — NICOTINE 21 MG/24HR
1 PATCH, TRANSDERMAL 24 HOURS TRANSDERMAL DAILY
Status: DISCONTINUED | OUTPATIENT
Start: 2021-05-29 | End: 2021-05-31

## 2021-05-29 NOTE — PLAN OF CARE
Problem: Patient/Family Goals  Goal: Patient/Family Long Term Goal  Description: Patient's Long Term Goal: Discharge home    Interventions:  - Pain tolerable  -Tolerating diet  - Return to previous ADL's    - See additional Care Plan goals for specific i call for assistance with activity based on assessment  - Modify environment to reduce risk of injury  - Provide assistive devices as appropriate  - Consider OT/PT consult to assist with strengthening/mobility  - Encourage toileting schedule  Outcome: Progr

## 2021-05-29 NOTE — PHYSICAL THERAPY NOTE
PHYSICAL THERAPY EVALUATION - INPATIENT     Room Number: 494/278-M  Evaluation Date: 5/29/2021  Type of Evaluation: Initial  Physician Order: PT Eval and Treat    Presenting Problem: diverticulitis, sigmoid stricture s/p robotic resection 5/28/21  Re shoulder  Management Techniques: Body mechanics; Relaxation;Repositioning    COGNITION  · Overall Cognitive Status:  WFL - within functional limits    RANGE OF MOTION AND STRENGTH ASSESSMENT  Upper extremity ROM and strength are within functional limits lightheadedness with BP WNL. Placed back in bed as per request. Reinforced safety, encourage to be up during meals and to walk with staff several times a day. Nursing is aware of this visit.     Exercise/Education Provided:  Bed mobility  Body mechanics  Fu able to demonstrate supine - sit EOB @ level: modified independent     Goal #2 Patient is able to demonstrate transfers Sit to/from Stand at assistance level: modified independent     Goal #3 Patient is able to ambulate 150 feet with assist device: none at

## 2021-05-29 NOTE — PROGRESS NOTES
[facility]  Progress Note    Karina Serrano Patient Status:  Inpatient    1945 MRN [id number]   [facility] 3NW-A Attending Shama Jenkins MD   Hosp Day # 1 PCP Caroline Johnston MD     Subjective:  No new complaints, incisional manny diverticulitis     Lower abdominal pain     Diverticulosis    Expected Ileus resolving    Plan:  1. Ambulate, DVT prophylaxis, GI prophylaxis  2. Advance diet as tolerated     My total face time with this patient was 30 minutes.   Greater than half of our v

## 2021-05-29 NOTE — PROGRESS NOTES
Patient has IV fluids infusing, on room air, phosphorus replaced per protocol, johnson discontinued and patient is voiding well, not passing flatus, transitioned to oral pain medication, ambulates with assist and a walker, incisions are C/D/I, ALIRIO drain x1 wi

## 2021-05-29 NOTE — PLAN OF CARE
Pt is A&O, VSS, with moderate pain to her LLQ- medication given. Pt is on RA with bilateral clear diminished lung sounds, . IS at bedside encouraged. Abdomen is soft and tender with hypoactive bowel sounds. Lap x4 C/D/I.  Right ALIRIO with serosanguinous ou appropriate  - Consider OT/PT consult to assist with strengthening/mobility  - Encourage toileting schedule  Outcome: Progressing     Problem: DISCHARGE PLANNING  Goal: Discharge to home or other facility with appropriate resources  Description: INTERVENTI

## 2021-05-30 PROCEDURE — 97535 SELF CARE MNGMENT TRAINING: CPT

## 2021-05-30 PROCEDURE — 87493 C DIFF AMPLIFIED PROBE: CPT | Performed by: COLON & RECTAL SURGERY

## 2021-05-30 PROCEDURE — 97166 OT EVAL MOD COMPLEX 45 MIN: CPT

## 2021-05-30 RX ORDER — OXYBUTYNIN CHLORIDE 10 MG/1
10 TABLET, EXTENDED RELEASE ORAL 2 TIMES DAILY
Status: DISCONTINUED | OUTPATIENT
Start: 2021-05-30 | End: 2021-05-31

## 2021-05-30 NOTE — OCCUPATIONAL THERAPY NOTE
OCCUPATIONAL THERAPY QUICK EVALUATION - INPATIENT    Room Number: 888/952-N  Evaluation Date: 5/30/2021     Type of Evaluation: Quick Eval  Presenting Problem: diverticulosis s/p low ant colon resection on 5/28     Physician Order: IP Consult to Occupation ASSESSMENT  Ratin  Location: stomach   Management Techniques:  Activity promotion;Relaxation;Repositioning    COGNITION  WFL    RANGE OF MOTION AND STRENGTH ASSESSMENT  Upper extremity ROM is within functional limits     Upper extremity strength is with the crossed leg method for LB dressing, but prefers to have her daughter assist at home. Pt was left with call light, telephone and personal items within reach. All questions were answered and pt's RN was notified of pt's present position and condition. lower extremities: with asisst from her daughter.    Patient/Caregiver able to demonstrate safety with ADLS: safely and independently

## 2021-05-30 NOTE — PROGRESS NOTES
[facility]  Progress Note    Constantino Serrano Patient Status:  Inpatient    1945 MRN [id number]   [facility] 3NW-A Attending Megan Mooney MD   Hosp Day # 2 PCP Helio Suarez MD     Subjective:  No new complaints.  She reports in Hank  5/30/2021  10:10 AM    ADDENDUM:     Patient was seen and examined. The patient complains of incisional pain. She is tolerating minimal diet. She did have some bowel movements. General: Alert, orientated x3. Cooperative. No apparent distress.

## 2021-05-30 NOTE — PROGRESS NOTES
Pt resting in bed, easy non labored breathing on ra. Vs wnl. Up with sb assist and walker. Hob 30 degrees. Iv hl. Pt tolerating low fiber diet. Denies any nausea. Voids adequate amount. Used brief for urgency /and or incont.  Lap sites x4 and transverse inc

## 2021-05-30 NOTE — PROGRESS NOTES
Pt tolerating low residue diet but c/o bloating after eating meal and reports poor appetite. Pt denies n/v. States she is passing small amounts of flatus with loose bm this afternoon. Pt instructed to take diet slow and eat in small amounts.  Pt shows under

## 2021-05-30 NOTE — PLAN OF CARE
Vss. Pt a&ox3. Pt on ra with 02 sats wnl. Lungs cta. Pt denies difficulty breathing or sob. Pt denies n/v. Tolerating low residue diet but reports poor appetite.   Reports she is passing flatus and states she did have small bm this am. Brief in place for in ADULT  Goal: Absence of fever/infection during anticipated neutropenic period  Description: INTERVENTIONS  - Monitor WBC  - Administer growth factors as ordered  - Implement neutropenic guidelines  Outcome: Progressing     Problem: [facility] communication style  - Implement communication aides and strategies  - Use visual cues when possible  - Listen attentively, be patient, do not interrupt  - Minimize distractions  - Allow time for understanding and response  - Establish method for patient t

## 2021-05-31 VITALS
HEART RATE: 80 BPM | DIASTOLIC BLOOD PRESSURE: 60 MMHG | HEIGHT: 64 IN | TEMPERATURE: 99 F | OXYGEN SATURATION: 96 % | SYSTOLIC BLOOD PRESSURE: 131 MMHG | BODY MASS INDEX: 22.25 KG/M2 | WEIGHT: 130.31 LBS | RESPIRATION RATE: 18 BRPM

## 2021-05-31 RX ORDER — HYDROCODONE BITARTRATE AND ACETAMINOPHEN 5; 325 MG/1; MG/1
1 TABLET ORAL EVERY 8 HOURS PRN
Qty: 20 TABLET | Refills: 0 | Status: SHIPPED | OUTPATIENT
Start: 2021-05-31 | End: 2021-06-07

## 2021-05-31 NOTE — PROGRESS NOTES
Pt resting in bed, easy non labored breathing on ra. Iv hl. Lap sites x4, x1 incision with s.s. c/d/i. jpx1 with travis output. Pt tolerating low residue diet, denies any nausea. Pt c/o poor appetite, instructed to have small frequent meals.  Brief in place f

## 2021-05-31 NOTE — PROGRESS NOTES
NURSING DISCHARGE NOTE    Discharged Home via Wheelchair. Accompanied by Support staff  Belongings Taken by patient/family. Vss. Pt a&ox3. Pt on ra with 02 sats wnl. Lungs cta. Pt denies difficulty breathing or sob. Pt denies chest pain or sob.  Pt

## 2021-05-31 NOTE — DISCHARGE SUMMARY
[facility]  Discharge Summary    Kamryn Serrano Patient Status:  Inpatient    1945 MRN [id number]   [facility] 3NW-A Attending Juan Sears MD   Hosp Day # 3 PCP Wilfredo Kehr, MD     Date of Admission: 2021    Date of Oz Parnell through the back. Ramya Nolbertobarb has had significant fixation of the lumbar spine.  Difficult to differentiate between her lumbar disease and her back pain which may be associated with the diverticulitis. Hospital Course:  The patient tolerated the above listed pr Visits: Follow-up with Lobito Pappas in approximately the next 7 days.      Other Discharge Instructions:    Soft diet  No lifting, no driving, the patient may shower  [place] for pain      Elisabeth Gudino  5/31/2021  9:54 AM

## 2021-05-31 NOTE — PROGRESS NOTES
[facility]  Progress Note    Leola Tanhri Patient Status:  Inpatient    1945 MRN [id number]   [facility] 3NW-A Attending Alexei Pizarro MD   Hosp Day # 3 PCP Jean-Claude Sheldon MD     Subjective: The patient is sitting up in bed. was seen and examined. She had a good bowel movement last night. She is tolerating soft diet. She denies nausea and vomiting. She would like to go home. General: Alert, orientated x3. Cooperative. No apparent distress.   Abdomen: Soft, non-distended,

## 2021-06-02 ENCOUNTER — TELEPHONE (OUTPATIENT)
Dept: SURGERY | Facility: CLINIC | Age: 76
End: 2021-06-02

## 2021-06-02 ENCOUNTER — LAB ENCOUNTER (OUTPATIENT)
Dept: LAB | Facility: HOSPITAL | Age: 76
End: 2021-06-02
Attending: COLON & RECTAL SURGERY
Payer: MEDICARE

## 2021-06-02 DIAGNOSIS — R19.7 WATERY DIARRHEA: Primary | ICD-10-CM

## 2021-06-02 DIAGNOSIS — R19.7 WATERY DIARRHEA: ICD-10-CM

## 2021-06-02 NOTE — TELEPHONE ENCOUNTER
OK for c-diff order per [facility]. Order placed. Patient notified. Patient educated on c-diff instructions. She v/u. Will report to [facility] to  stool kit.

## 2021-06-02 NOTE — TELEPHONE ENCOUNTER
Patient called office. She is s/p ROBOTIC LOW ANTERIOR RESECTION OF THE RECTOSIGMOID COLON WITH MOBILIZATION SPLENIC FLEXURE on 5/28. C/o watery diarrhea and abdominal cramping. Denies fever/chills. msg sent to [facility] for cdiff order.

## 2021-06-03 ENCOUNTER — TELEPHONE (OUTPATIENT)
Dept: SURGERY | Facility: CLINIC | Age: 76
End: 2021-06-03

## 2021-06-03 NOTE — TELEPHONE ENCOUNTER
Called patient. Per kirsten PIERRE, patient to begin metamucil daily and follow up Monday. Patient notified and v/u. Patient has appt scheduled.     Future Appointments   Date Time Provider Della Stout   6/7/2021  2:00 PM Lars Rodriguez MD [facility]

## 2021-06-03 NOTE — TELEPHONE ENCOUNTER
patient s/p ROBOTIC LOW ANTERIOR RESECTION OF THE RECTOSIGMOID COLON WITH MOBILIZATION SPLENIC FLEXURE on 5/28. cdiff test came back negative. states is still having about 10 episodes of diarrhea daily. today a little more solidified then other days.  is pu

## 2021-06-04 ENCOUNTER — MED REC SCAN ONLY (OUTPATIENT)
Dept: SURGERY | Facility: CLINIC | Age: 76
End: 2021-06-04

## 2021-06-07 ENCOUNTER — OFFICE VISIT (OUTPATIENT)
Dept: SURGERY | Facility: CLINIC | Age: 76
End: 2021-06-07

## 2021-06-07 VITALS — SYSTOLIC BLOOD PRESSURE: 112 MMHG | DIASTOLIC BLOOD PRESSURE: 74 MMHG | HEART RATE: 89 BPM | TEMPERATURE: 97 F

## 2021-06-07 DIAGNOSIS — K57.90 DIVERTICULOSIS: Primary | ICD-10-CM

## 2021-06-07 PROCEDURE — 99024 POSTOP FOLLOW-UP VISIT: CPT | Performed by: COLON & RECTAL SURGERY

## 2021-06-07 RX ORDER — CHOLESTYRAMINE LIGHT 4 G/5.7G
4 POWDER, FOR SUSPENSION ORAL 3 TIMES DAILY
Qty: 1 EACH | Refills: 0 | Status: SHIPPED | OUTPATIENT
Start: 2021-06-07 | End: 2021-07-23 | Stop reason: ALTCHOICE

## 2021-06-07 NOTE — PATIENT INSTRUCTIONS
This patient presents for further care and treatment after undergoing a low anterior resection of rectosigmoid colon for acute and chronic diverticulitis with sigmoid stricture. The procedure was performed on May 28, 2021.     This patient has had diarrhea

## 2021-06-07 NOTE — PROGRESS NOTES
Post Operative Visit Note       Active Problems  1.  Diverticulosis         Chief Complaint   Patient presents with:  Post-Op: PO - 5/28 ROBOTIC LOW ANTERIOR RESECTION OF THE RECTOSIGMOID COLON WITH MOBILIZATION SPLENIC FLEXURE. pt c/o of pain at drain site Smoking status: Current Every Day Smoker        Packs/day: 0.25      Smokeless tobacco: Never Used    Vaping Use      Vaping Use: Never used    Substance and Sexual Activity      Alcohol use: No      Drug use: No    Other Topics      Con[facility]ns[facility]        [facility], anal bleeding, blood in stool, constipation, nausea and vomiting. Genitourinary: Negative for difficulty urinating, dysuria, frequency and urgency. Musculoskeletal: Negative for arthralgias and myalgias.    Skin: Negative for color Drain is clear serous. I did remove the drain at today's visit and placed tape. Incisions are all clean, dry, intact, without erythema or cellulitis. We will start the patient on Questran 1 packet 3 times daily.     I will see his patient again in 1 we

## 2021-06-09 ENCOUNTER — TELEPHONE (OUTPATIENT)
Dept: SURGERY | Facility: CLINIC | Age: 76
End: 2021-06-09

## 2021-06-09 NOTE — TELEPHONE ENCOUNTER
Pt seen 2 day ago and is still having diarrhea and wants another medication. Explained that the powder may take longer than 2 days to work. Pt was also only taking 1 dose per day, explained TID. Pt JOSEPH.

## 2021-06-11 ENCOUNTER — TELEPHONE (OUTPATIENT)
Dept: SURGERY | Facility: CLINIC | Age: 76
End: 2021-06-11

## 2021-06-11 DIAGNOSIS — R19.7 DIARRHEA OF PRESUMED INFECTIOUS ORIGIN: Primary | ICD-10-CM

## 2021-06-11 NOTE — TELEPHONE ENCOUNTER
Pt calling and states she is still having diarrhea 10x per day. Only taken cholestryamine TID for 2days. Explained it takes longer than that to work. Msg out to [facility]. Diff test ordered. Pt states she is having formed stool.  Informed pt that this test is o

## 2021-06-14 ENCOUNTER — OFFICE VISIT (OUTPATIENT)
Dept: SURGERY | Facility: CLINIC | Age: 76
End: 2021-06-14
Payer: MEDICARE

## 2021-06-14 VITALS
DIASTOLIC BLOOD PRESSURE: 73 MMHG | SYSTOLIC BLOOD PRESSURE: 116 MMHG | TEMPERATURE: 97 F | HEART RATE: 81 BPM | WEIGHT: 130 LBS | BODY MASS INDEX: 22.2 KG/M2 | HEIGHT: 64 IN

## 2021-06-14 DIAGNOSIS — K57.92 ACUTE DIVERTICULITIS: Primary | ICD-10-CM

## 2021-06-14 PROCEDURE — 1111F DSCHRG MED/CURRENT MED MERGE: CPT | Performed by: COLON & RECTAL SURGERY

## 2021-06-14 PROCEDURE — 99024 POSTOP FOLLOW-UP VISIT: CPT | Performed by: COLON & RECTAL SURGERY

## 2021-06-14 NOTE — PATIENT INSTRUCTIONS
The patient presents today with her daughter for continued care and evaluation after undergoing a robotic low anterior resection of the rectosigmoid colon for diverticulitis on May 28, 2021. The patient was previously having a lot of diarrhea.   She was

## 2021-06-14 NOTE — PROGRESS NOTES
Post Operative Visit Note       Active Problems  1. Acute diverticulitis         Chief Complaint   Patient presents with:  Post-Op: PO - 5/28 ROBOTIC LOW ANTERIOR RESECTION OF THE RECTOSIGMOID COLON WITH MOBILIZATION SPLENIC FLEXURE.  Pt states diarrhea is • Depression    • Diverticulosis of large intestine    • High cholesterol    • Other and unspecified hyperlipidemia    • Visual impairment     reading glasses     History reviewed. No pertinent surgical history.     The family history and social history h Oral Tab, Take 500 mg by mouth every 6 (six) hours as needed for Pain.  , Disp: , Rfl:       Review of Systems  The Review of Systems has been reviewed by me during today.   Review of Systems   Constitutional: Negative for chills, diaphoresis, fatigue, feve robotic low anterior resection of the rectosigmoid colon for diverticulitis on May 28, 2021. The patient was previously having a lot of diarrhea. She was having 10-12 bowel movements per day.   She was started on Questran 3 times a day at her last offic

## 2021-06-23 ENCOUNTER — TELEPHONE (OUTPATIENT)
Dept: INTERNAL MEDICINE CLINIC | Facility: CLINIC | Age: 76
End: 2021-06-23

## 2021-06-23 DIAGNOSIS — M81.0 AGE-RELATED OSTEOPOROSIS WITHOUT CURRENT PATHOLOGICAL FRACTURE: Primary | ICD-10-CM

## 2021-06-23 DIAGNOSIS — R32 URINARY INCONTINENCE, UNSPECIFIED TYPE: ICD-10-CM

## 2021-06-23 NOTE — TELEPHONE ENCOUNTER
Functional Status Done?:    yes                      Provider's Name?:  DR Maxine Rincon  Provider's Specialty?:  Urology  Reason for Visit?:  FU   Diagnosis?:  Kidney stone   Number of Visits Requested?:  2  Last Visit with Specialist?:  3/21  Is Appt

## 2021-06-23 NOTE — TELEPHONE ENCOUNTER
Wrong dr listed for rheum. Pt sees Dr Madyson Jacob. Notes in epic. Is pt up to date on OVs with PCP?: yes  Has pt been seen/referred for condition?: yes  Is specialist in network?: this is who pt has been seeing.  Unsure if in network [facility] advantage

## 2021-07-19 ENCOUNTER — OFFICE VISIT (OUTPATIENT)
Dept: SURGERY | Facility: CLINIC | Age: 76
End: 2021-07-19
Payer: MEDICARE

## 2021-07-19 DIAGNOSIS — R82.90 URINE FINDING: Primary | ICD-10-CM

## 2021-07-19 DIAGNOSIS — Z87.440 HISTORY OF URINARY TRACT INFECTION: ICD-10-CM

## 2021-07-19 DIAGNOSIS — N28.89: ICD-10-CM

## 2021-07-19 DIAGNOSIS — Z87.442 HISTORY OF KIDNEY STONES: ICD-10-CM

## 2021-07-19 LAB
APPEARANCE: CLEAR
BILIRUBIN: NEGATIVE
GLUCOSE (URINE DIPSTICK): NEGATIVE MG/DL
KETONES (URINE DIPSTICK): NEGATIVE MG/DL
LEUKOCYTES: NEGATIVE
MULTISTIX LOT#: NORMAL NUMERIC
NITRITE, URINE: NEGATIVE
OCCULT BLOOD: NEGATIVE
PH, URINE: 5.5 (ref 4.5–8)
PROTEIN (URINE DIPSTICK): NEGATIVE MG/DL
SPECIFIC GRAVITY: >=1.03 (ref 1–1.03)
URINE-COLOR: YELLOW
UROBILINOGEN,SEMI-QN: 0.2 MG/DL (ref 0–1.9)

## 2021-07-19 PROCEDURE — 99213 OFFICE O/P EST LOW 20 MIN: CPT | Performed by: UROLOGY

## 2021-07-19 PROCEDURE — 81003 URINALYSIS AUTO W/O SCOPE: CPT | Performed by: UROLOGY

## 2021-07-19 RX ORDER — OXYBUTYNIN CHLORIDE 10 MG/1
10 TABLET, EXTENDED RELEASE ORAL DAILY
Qty: 90 TABLET | Refills: 3 | Status: SHIPPED | OUTPATIENT
Start: 2021-07-19

## 2021-07-19 NOTE — PROGRESS NOTES
Rooming Clinician:     Leesa Maciel is a 77year old female. Patient presents with: Follow - Up: ua and culture check. c/o urinary frequency. . Dr needs to clarify dosing for oxybutynin and then needs refill        HPI:     Patient returns for follow Never used    Alcohol use: No    Drug use: No       REVIEW OF SYSTEMS:     GENERAL HEALTH: feels well otherwise  SKIN: denies any unusual skin lesions or rashes  RESPIRATORY: denies shortness of breath with exertion  CARDIOVASCULAR: denies chest pain on ex

## 2021-07-19 NOTE — PATIENT INSTRUCTIONS
On behalf of myself and care team, I would like to thank you for entrusting us with your care today. It is our goal to provide you and your family with excellent care.   Please note that you may receive a survey in the mail; any feedback you have for this or other activities. Try to stay cool in hot weather. Tell the doctor or pharmacist if you are pregnant, planning to be pregnant, or breastfeeding. Ask your pharmacist if this medicine can interact with any of your other medicines.  Be sure to tell them a questions, please ask your pharmacist.   © 2021 [address]

## 2021-07-23 ENCOUNTER — OFFICE VISIT (OUTPATIENT)
Dept: INTERNAL MEDICINE CLINIC | Facility: CLINIC | Age: 76
End: 2021-07-23
Payer: MEDICARE

## 2021-07-23 VITALS
TEMPERATURE: 99 F | HEIGHT: 60 IN | OXYGEN SATURATION: 96 % | SYSTOLIC BLOOD PRESSURE: 114 MMHG | HEART RATE: 84 BPM | RESPIRATION RATE: 14 BRPM | DIASTOLIC BLOOD PRESSURE: 64 MMHG | WEIGHT: 131.38 LBS | BODY MASS INDEX: 25.79 KG/M2

## 2021-07-23 DIAGNOSIS — Z00.00 ENCOUNTER FOR ANNUAL HEALTH EXAMINATION: Primary | ICD-10-CM

## 2021-07-23 DIAGNOSIS — G95.9 LUMBAR MYELOPATHY (HCC): ICD-10-CM

## 2021-07-23 DIAGNOSIS — N32.81 OVERACTIVE BLADDER: ICD-10-CM

## 2021-07-23 DIAGNOSIS — M19.042 OSTEOARTHRITIS OF BOTH HANDS, UNSPECIFIED OSTEOARTHRITIS TYPE: ICD-10-CM

## 2021-07-23 DIAGNOSIS — Z12.31 BREAST CANCER SCREENING BY MAMMOGRAM: ICD-10-CM

## 2021-07-23 DIAGNOSIS — F51.04 PSYCHOPHYSIOLOGICAL INSOMNIA: ICD-10-CM

## 2021-07-23 DIAGNOSIS — M81.0 AGE-RELATED OSTEOPOROSIS WITHOUT CURRENT PATHOLOGICAL FRACTURE: ICD-10-CM

## 2021-07-23 DIAGNOSIS — J41.0 SMOKERS' COUGH (HCC): Chronic | ICD-10-CM

## 2021-07-23 DIAGNOSIS — K57.90 DIVERTICULOSIS: ICD-10-CM

## 2021-07-23 DIAGNOSIS — F17.200 TOBACCO USE DISORDER: ICD-10-CM

## 2021-07-23 DIAGNOSIS — E78.2 MIXED HYPERLIPIDEMIA: ICD-10-CM

## 2021-07-23 DIAGNOSIS — F39 MOOD DISORDER (HCC): ICD-10-CM

## 2021-07-23 DIAGNOSIS — Z13.29 SCREENING FOR THYROID DISORDER: ICD-10-CM

## 2021-07-23 DIAGNOSIS — K59.00 CONSTIPATION, UNSPECIFIED CONSTIPATION TYPE: ICD-10-CM

## 2021-07-23 DIAGNOSIS — M19.041 OSTEOARTHRITIS OF BOTH HANDS, UNSPECIFIED OSTEOARTHRITIS TYPE: ICD-10-CM

## 2021-07-23 PROBLEM — I95.9 HYPOTENSION: Status: RESOLVED | Noted: 2019-09-01 | Resolved: 2021-07-23

## 2021-07-23 PROBLEM — K57.92 ACUTE DIVERTICULITIS: Status: RESOLVED | Noted: 2021-03-02 | Resolved: 2021-07-23

## 2021-07-23 PROBLEM — M79.604 RIGHT LEG PAIN: Status: ACTIVE | Noted: 2019-08-30

## 2021-07-23 PROBLEM — I95.9 HYPOTENSION: Status: ACTIVE | Noted: 2019-09-01

## 2021-07-23 PROCEDURE — G0439 PPPS, SUBSEQ VISIT: HCPCS | Performed by: NURSE PRACTITIONER

## 2021-07-23 RX ORDER — SERTRALINE HYDROCHLORIDE 100 MG/1
200 TABLET, FILM COATED ORAL DAILY
Qty: 180 TABLET | Refills: 1 | Status: SHIPPED | OUTPATIENT
Start: 2021-07-23 | End: 2022-01-27

## 2021-07-23 RX ORDER — ATORVASTATIN CALCIUM 10 MG/1
10 TABLET, FILM COATED ORAL EVERY EVENING
Qty: 90 TABLET | Refills: 1 | Status: SHIPPED | OUTPATIENT
Start: 2021-07-23 | End: 2021-08-19

## 2021-07-23 RX ORDER — TRAZODONE HYDROCHLORIDE 150 MG/1
150 TABLET ORAL NIGHTLY
Qty: 90 TABLET | Refills: 1 | Status: SHIPPED | OUTPATIENT
Start: 2021-07-23 | End: 2022-01-27

## 2021-07-23 NOTE — PATIENT INSTRUCTIONS
Get your labs done in August. You should be fasting for at least 10 hours. If you take a multivitamin with Biotin or any biotin product it should be held for 3 days prior to getting your labs done. See rheumatology for your bone shot.   See when they wan Cancer Screening  Covered for ages 52-80; only need ONE of the following:    Colonoscopy   Covered every 10 years    Covered every 2 years if patient is at high risk or previous colonoscopy was abnormal 02/22/2016    Colonoscopy due on 02/22/2026    Flexib (COPD)    Spirometry Annually Spirometry date:      Recommended Websites for Advanced Directives    [email or web address] [email or web address] pdf  An information packet, including necessary form from the [employer] website

## 2021-07-23 NOTE — PROGRESS NOTES
HPI:   Jayla Dela Cruz is a 77year old female who presents for a MA (Medicare Advantage) Supervisit (Once per calendar year). Annual Physical due on 07/23/2022      The patient is doing well. She is exercisng. Has a good appetite.  Is not drinking alc (update Vitals or Tob Hx section to nelson Tobacco Cessation counseling given as YES and refresh this link)        Smoking cessation counseling given    CAGE screening score of 0 on 7/23/2021, showing low risk of alcohol abuse.         Patient Care Team: Rafia Nunez hyperlipidemia, and Visual impairment. She  has no past surgical history on file. Her family history includes Breast Cancer (age of onset: 40) in her sister. SOCIAL HISTORY:   She  reports that she has been smoking.  She has been smoking about 0.25 clear to auscultation bilaterally  Breasts:  deferred per patient request  Heart: S1, S2 normal, regular rate and rhythm  Abdomen: soft, non-tender; bowel sounds normal; no masses,  no organomegaly  Pelvic: deferred  Extremities: extremities normal, atraum pathological fracture  - continue to see rheumatology   -     XR DEXA BONE DENSITOMETRY (CPT=77080); Future    Overactive bladder  -Continue to see urology    Breast cancer screening by mammogram  -     [facility] URIAH 2D+3D SCREENING BILAT (CPT=77067/78764);  Fut CHOLEST 174 08/04/2020    HDL 58 08/04/2020    LDL 80 08/04/2020    TRIG 181 (H) 08/04/2020         Electrocardiogram (EKG)   Covered if needed at Welcome to Medicare, and non-screening if indicated for medical reasons -      Ultrasound Screening for Abdom Tetanus Toxoid Not covered by Medicare Part B unless medically necessary (cut with metal); may be covered with your pharmacy prescription benefits -    Tetanus, Diptheria and Pertusis TD and TDaP Not covered by Medicare Part B -  No recommendations at this

## 2021-07-25 PROBLEM — M79.604 RIGHT LEG PAIN: Status: RESOLVED | Noted: 2019-08-30 | Resolved: 2021-07-25

## 2021-07-25 PROBLEM — F39 MOOD DISORDER (HCC): Status: ACTIVE | Noted: 2018-02-15

## 2021-07-25 PROBLEM — R10.30 LOWER ABDOMINAL PAIN: Status: RESOLVED | Noted: 2021-03-02 | Resolved: 2021-07-25

## 2021-07-25 PROBLEM — G95.9 LUMBAR MYELOPATHY (HCC): Status: ACTIVE | Noted: 2018-11-14

## 2021-07-28 ENCOUNTER — TELEPHONE (OUTPATIENT)
Dept: INTERNAL MEDICINE CLINIC | Facility: CLINIC | Age: 76
End: 2021-07-28

## 2021-07-28 NOTE — TELEPHONE ENCOUNTER
Pt called and wanted to let Ella Cirilo know that she got her first shingles vaccine at [place] in [facility] on 7/27/2020 and the 2nd at the [facility] on 1/13/21  Thank you

## 2021-08-10 ENCOUNTER — HOSPITAL ENCOUNTER (OUTPATIENT)
Dept: CT IMAGING | Facility: HOSPITAL | Age: 76
Discharge: HOME | End: 2021-08-10
Attending: NURSE PRACTITIONER
Payer: MEDICARE

## 2021-08-10 DIAGNOSIS — F17.200 TOBACCO USE DISORDER: ICD-10-CM

## 2021-08-10 PROCEDURE — 71271 CT THORAX LUNG CANCER SCR C-: CPT | Performed by: NURSE PRACTITIONER

## 2021-08-18 ENCOUNTER — LAB ENCOUNTER (OUTPATIENT)
Dept: LAB | Age: 76
End: 2021-08-18
Attending: NURSE PRACTITIONER
Payer: MEDICARE

## 2021-08-18 ENCOUNTER — HOSPITAL ENCOUNTER (OUTPATIENT)
Dept: MAMMOGRAPHY | Age: 76
Discharge: HOME | End: 2021-08-18
Attending: NURSE PRACTITIONER
Payer: MEDICARE

## 2021-08-18 ENCOUNTER — HOSPITAL ENCOUNTER (OUTPATIENT)
Dept: BONE DENSITY | Age: 76
Discharge: HOME | End: 2021-08-18
Attending: NURSE PRACTITIONER
Payer: MEDICARE

## 2021-08-18 DIAGNOSIS — Z00.00 ENCOUNTER FOR ANNUAL HEALTH EXAMINATION: ICD-10-CM

## 2021-08-18 DIAGNOSIS — Z13.29 SCREENING FOR THYROID DISORDER: ICD-10-CM

## 2021-08-18 DIAGNOSIS — E78.2 MIXED HYPERLIPIDEMIA: ICD-10-CM

## 2021-08-18 DIAGNOSIS — M81.0 AGE-RELATED OSTEOPOROSIS WITHOUT CURRENT PATHOLOGICAL FRACTURE: ICD-10-CM

## 2021-08-18 DIAGNOSIS — Z12.31 BREAST CANCER SCREENING BY MAMMOGRAM: ICD-10-CM

## 2021-08-18 LAB
ALBUMIN SERPL-MCNC: 4 G/DL (ref 3.4–5)
ALBUMIN/GLOB SERPL: 1.3 {RATIO} (ref 1–2)
ALP LIVER SERPL-CCNC: 70 U/L
ALT SERPL-CCNC: 22 U/L
ANION GAP SERPL CALC-SCNC: 1 MMOL/L (ref 0–18)
AST SERPL-CCNC: 16 U/L (ref 15–37)
BASOPHILS # BLD AUTO: 0.02 X10(3) UL (ref 0–0.2)
BASOPHILS NFR BLD AUTO: 0.4 %
BILIRUB SERPL-MCNC: 0.4 MG/DL (ref 0.1–2)
BUN BLD-MCNC: 15 MG/DL (ref 7–18)
CALCIUM BLD-MCNC: 9.3 MG/DL (ref 8.5–10.1)
CHLORIDE SERPL-SCNC: 109 MMOL/L (ref 98–112)
CHOLEST SMN-MCNC: 214 MG/DL (ref ?–200)
CO2 SERPL-SCNC: 29 MMOL/L (ref 21–32)
CREAT BLD-MCNC: 0.73 MG/DL
EOSINOPHIL # BLD AUTO: 0.17 X10(3) UL (ref 0–0.7)
EOSINOPHIL NFR BLD AUTO: 3.2 %
ERYTHROCYTE [DISTWIDTH] IN BLOOD BY AUTOMATED COUNT: 14.5 %
GLOBULIN PLAS-MCNC: 3.2 G/DL (ref 2.8–4.4)
GLUCOSE BLD-MCNC: 101 MG/DL (ref 70–99)
HCT VFR BLD AUTO: 43.9 %
HDLC SERPL-MCNC: 63 MG/DL (ref 40–59)
HGB BLD-MCNC: 13.8 G/DL
IMM GRANULOCYTES # BLD AUTO: 0.02 X10(3) UL (ref 0–1)
IMM GRANULOCYTES NFR BLD: 0.4 %
LDLC SERPL CALC-MCNC: 126 MG/DL (ref ?–100)
LYMPHOCYTES # BLD AUTO: 1.84 X10(3) UL (ref 1–4)
LYMPHOCYTES NFR BLD AUTO: 34.6 %
M PROTEIN MFR SERPL ELPH: 7.2 G/DL (ref 6.4–8.2)
MCH RBC QN AUTO: 31.9 PG (ref 26–34)
MCHC RBC AUTO-ENTMCNC: 31.4 G/DL (ref 31–37)
MCV RBC AUTO: 101.4 FL
MONOCYTES # BLD AUTO: 0.46 X10(3) UL (ref 0.1–1)
MONOCYTES NFR BLD AUTO: 8.6 %
NEUTROPHILS # BLD AUTO: 2.81 X10 (3) UL (ref 1.5–7.7)
NEUTROPHILS # BLD AUTO: 2.81 X10(3) UL (ref 1.5–7.7)
NEUTROPHILS NFR BLD AUTO: 52.8 %
NONHDLC SERPL-MCNC: 151 MG/DL (ref ?–130)
OSMOLALITY SERPL CALC.SUM OF ELEC: 289 MOSM/KG (ref 275–295)
PATIENT FASTING Y/N/NP: YES
PATIENT FASTING Y/N/NP: YES
PLATELET # BLD AUTO: 196 10(3)UL (ref 150–450)
POTASSIUM SERPL-SCNC: 4.3 MMOL/L (ref 3.5–5.1)
RBC # BLD AUTO: 4.33 X10(6)UL
SODIUM SERPL-SCNC: 139 MMOL/L (ref 136–145)
TRIGL SERPL-MCNC: 144 MG/DL (ref 30–149)
TSI SER-ACNC: 2.48 MIU/ML (ref 0.36–3.74)
VLDLC SERPL CALC-MCNC: 26 MG/DL (ref 0–30)
WBC # BLD AUTO: 5.3 X10(3) UL (ref 4–11)

## 2021-08-18 PROCEDURE — 80053 COMPREHEN METABOLIC PANEL: CPT

## 2021-08-18 PROCEDURE — 36415 COLL VENOUS BLD VENIPUNCTURE: CPT

## 2021-08-18 PROCEDURE — 80061 LIPID PANEL: CPT

## 2021-08-18 PROCEDURE — 77067 SCR MAMMO BI INCL CAD: CPT | Performed by: NURSE PRACTITIONER

## 2021-08-18 PROCEDURE — 85025 COMPLETE CBC W/AUTO DIFF WBC: CPT

## 2021-08-18 PROCEDURE — 84443 ASSAY THYROID STIM HORMONE: CPT

## 2021-08-18 PROCEDURE — 77063 BREAST TOMOSYNTHESIS BI: CPT | Performed by: NURSE PRACTITIONER

## 2021-08-18 PROCEDURE — 77080 DXA BONE DENSITY AXIAL: CPT | Performed by: NURSE PRACTITIONER

## 2021-08-19 NOTE — PROGRESS NOTES
Spoke to pt. Made aware of results & recommendations. Pt voiced understanding.   Pt stated she is taking Statin regularly  Notified to increase dose to 20 mg   Pt v/u  Rx sent to pharmacy  Appt made to go over dexa scan results  Medication list updated

## 2021-08-23 ENCOUNTER — OFFICE VISIT (OUTPATIENT)
Dept: INTERNAL MEDICINE CLINIC | Facility: CLINIC | Age: 76
End: 2021-08-23
Payer: MEDICARE

## 2021-08-23 VITALS
OXYGEN SATURATION: 96 % | DIASTOLIC BLOOD PRESSURE: 62 MMHG | TEMPERATURE: 99 F | WEIGHT: 131.19 LBS | SYSTOLIC BLOOD PRESSURE: 110 MMHG | HEART RATE: 82 BPM | HEIGHT: 60.5 IN | RESPIRATION RATE: 14 BRPM | BODY MASS INDEX: 25.09 KG/M2

## 2021-08-23 DIAGNOSIS — M81.0 AGE-RELATED OSTEOPOROSIS WITHOUT CURRENT PATHOLOGICAL FRACTURE: Primary | ICD-10-CM

## 2021-08-23 DIAGNOSIS — R71.8 ELEVATED MCV: ICD-10-CM

## 2021-08-23 DIAGNOSIS — M25.552 LEFT HIP PAIN: ICD-10-CM

## 2021-08-23 PROCEDURE — 99214 OFFICE O/P EST MOD 30 MIN: CPT | Performed by: NURSE PRACTITIONER

## 2021-08-23 NOTE — PROGRESS NOTES
Norm Mtz is a 77year old female. CHIEF COMPLAINT   Osteoporosis, hip pain    HPI:   Has been on reclast with rheumatology. Does not want to go back to them because she did not like them and it is too far.  She had a dexa recently that shows slight normocephalic  LUNGS: clear to auscultation; no rhonchi, rales, or wheezing  CARDIO: RRR without murmur  GI: good BS's,no masses, organomegaly or tenderness  MUSCULOSKELETAL: No obvious joint deformity or swelling.  Some pain with abduction and adduction to departmental protocol. Automated exposure control for dose reduction was used. Adjustment of the mA and/or kV was done based on the patient's size. Use of iterative reconstruction  technique for dose reduction was used.  Dose reduction techniques were used Continued routine annual LDCT lung screening. Suggest next exam on or around August of 2022.     CT Lung Screening Reporting and Data System (Lung-RADS 1.1)    Lung Cancer Specific Category   ACR -Guidelines Based Follow-up   Category    Assessment (g/cm2):  0.554    Femoral neck T-Score:  -2.7    % young normals:  65    % age matched controls:  80    Change from prior hip examination:  5.8%. This is statistically significant. ADDITIONAL FINDINGS:  No significant additional findings. BI-RADS CATEGORY:  DIAGNOSTIC CATEGORY 2--BENIGN FINDING NO CHANGE FROM COMPARISON ASSESSMENT. RECOMMENDATIONS:  ROUTINE MAMMOGRAM AND CLINICAL EVALUATION IN 12 MONTHS. A letter explaining the results in lay terms has been sent to the patient.   Kiersten Husain

## 2021-08-23 NOTE — PATIENT INSTRUCTIONS
Get your labs done in November. You should be fasting for at least 10 hours. If you take a multivitamin with Biotin or any biotin product it should be held for 3 days prior to getting your labs done.     Follow up with endocrinology for the Reclast for your

## 2021-08-24 DIAGNOSIS — F32.9 MAJOR DEPRESSIVE DISORDER, REMISSION STATUS UNSPECIFIED, UNSPECIFIED WHETHER RECURRENT: ICD-10-CM

## 2021-08-24 DIAGNOSIS — F51.04 PSYCHOPHYSIOLOGICAL INSOMNIA: ICD-10-CM

## 2021-08-26 ENCOUNTER — TELEPHONE (OUTPATIENT)
Dept: INTERNAL MEDICINE CLINIC | Facility: CLINIC | Age: 76
End: 2021-08-26

## 2021-08-26 DIAGNOSIS — M25.552 LEFT HIP PAIN: Primary | ICD-10-CM

## 2021-08-26 NOTE — TELEPHONE ENCOUNTER
Provider's Name?:  [address] [place] [address]   Provider's Specialty?:  PT   Reason for Visit?:  Therapy   Diagnosis?:  Hips and shoulder pain  Number of Visits Requested?: 8  Last Visit with Specialist?:  8/25/21  Is Appt.  Already S

## 2021-08-26 NOTE — TELEPHONE ENCOUNTER
Provider's Name?: Dr. Avis Aguilera?: ophthalmologist  Reason for Visit?:  Check UP   Diagnosis?:    Number of Visits Requested?:  1  Last Visit with Specialist?:    Is Appt. Already Scheduled?:  Yes       If so, Date?:  09/02/2021  Does pt need call back?: Yes      Pt gave a fax #.  #[phone]

## 2021-08-27 RX ORDER — TRAZODONE HYDROCHLORIDE 50 MG/1
TABLET ORAL
Qty: 90 TABLET | Refills: 0 | OUTPATIENT
Start: 2021-08-27

## 2021-08-30 ENCOUNTER — LAB ENCOUNTER (OUTPATIENT)
Dept: LAB | Age: 76
End: 2021-08-30
Attending: INTERNAL MEDICINE
Payer: MEDICARE

## 2021-08-30 DIAGNOSIS — M81.0 AGE-RELATED OSTEOPOROSIS WITHOUT CURRENT PATHOLOGICAL FRACTURE: ICD-10-CM

## 2021-08-30 LAB
CALCIUM 24H UR-SRATE: 150 MG/24HR (ref 42–353)
SPECIMEN VOL UR: 1000 ML

## 2021-08-30 PROCEDURE — 82340 ASSAY OF CALCIUM IN URINE: CPT

## 2021-08-31 NOTE — TELEPHONE ENCOUNTER
[facility] calling and asked if their name can be on the referral because pt has an HMO     Please advise    Thank you

## 2021-09-07 ENCOUNTER — TELEPHONE (OUTPATIENT)
Dept: INTERNAL MEDICINE CLINIC | Facility: CLINIC | Age: 76
End: 2021-09-07

## 2021-09-07 NOTE — TELEPHONE ENCOUNTER
Received Medicare Initial Eval from [facility]   To Jessica for review & signature(s). To fax back to # [phone] & send to scanning.

## 2021-09-07 NOTE — TELEPHONE ENCOUNTER
Incoming (mail or fax):  fax  Received from:  [facility]  Documentation given to:  Triage incoming bin

## 2021-09-13 ENCOUNTER — OFFICE VISIT (OUTPATIENT)
Dept: SURGERY | Facility: CLINIC | Age: 76
End: 2021-09-13
Payer: MEDICARE

## 2021-09-13 VITALS
SYSTOLIC BLOOD PRESSURE: 109 MMHG | DIASTOLIC BLOOD PRESSURE: 72 MMHG | HEART RATE: 81 BPM | TEMPERATURE: 97 F | WEIGHT: 129 LBS | BODY MASS INDEX: 24.67 KG/M2 | HEIGHT: 60.5 IN

## 2021-09-13 DIAGNOSIS — K57.90 DIVERTICULOSIS: Primary | ICD-10-CM

## 2021-09-13 PROCEDURE — 99213 OFFICE O/P EST LOW 20 MIN: CPT | Performed by: COLON & RECTAL SURGERY

## 2021-09-13 NOTE — PROGRESS NOTES
Patient informed of Dr. Chau Rivera result note. Patient verbalized understanding and agrees with plan.

## 2021-09-13 NOTE — PROGRESS NOTES
Follow Up Visit Note       Active Problems      1.  Diverticulosis          Chief Complaint   Patient presents with:  Wound Recheck: 3 month f/u- s/p  5/28 ROBOTIC LOW ANTERIOR RESECTION OF THE RECTOSIGMOID COLON WITH MOBILIZATION SPLENIC FLEXURE- Pt states family history and social history have been reviewed by me today. Family History   Problem Relation Age of Onset   • Breast Cancer Sister 40     Social History    Socioeconomic History      Marital status:      Tobacco Use      Smoking status: Cur Respiratory: Negative for apnea, cough, shortness of breath and wheezing. Cardiovascular: Negative for chest pain, palpitations and leg swelling.    Gastrointestinal: Negative for abdominal distention, abdominal pain, anal bleeding, blood in stool, con soft, nontender, nondistended, good bowel sounds. No guarding or rebound. No masses. No ascites. Liver is normal, spleen is not palpable. There are no inguinal, umbilical, or incisional hernias present. All of her robotic incisions are well-healed.

## 2021-09-13 NOTE — PATIENT INSTRUCTIONS
This patient underwent a robotic low anterior resection of rectosigmoid colon for diverticulitis on May 28, 2021. Made to fully mobilized the splenic flexure. The patient developed some diarrhea after surgery. We started her on Questran.     The patien

## 2021-09-16 ENCOUNTER — TELEPHONE (OUTPATIENT)
Dept: INTERNAL MEDICINE CLINIC | Facility: CLINIC | Age: 76
End: 2021-09-16

## 2021-09-16 NOTE — TELEPHONE ENCOUNTER
Left detailed message to call her insuance to find out which Endo provider will be covered in hr plan and let us know

## 2021-09-16 NOTE — TELEPHONE ENCOUNTER
Can we check who is in her network before we refer. There is Dr. Jerome Burns. I do not know who is in network. Thanks.

## 2021-09-16 NOTE — TELEPHONE ENCOUNTER
Pt would yenny to have a recommendation of a new Endo. Pt stated that Pt got a bill from an appt with Dr Patrizia Anne and it was for over  $400. Pt would like to have a couple names of new endos that are in network.    Thank you

## 2021-09-20 ENCOUNTER — TELEPHONE (OUTPATIENT)
Dept: INTERNAL MEDICINE CLINIC | Facility: CLINIC | Age: 76
End: 2021-09-20

## 2021-09-20 ENCOUNTER — TELEPHONE (OUTPATIENT)
Dept: ORTHOPEDICS CLINIC | Facility: CLINIC | Age: 76
End: 2021-09-20

## 2021-09-20 DIAGNOSIS — M25.552 LEFT HIP PAIN: Primary | ICD-10-CM

## 2021-09-20 NOTE — TELEPHONE ENCOUNTER
Order left hip xray and ortho- [facility]. If she wants to discuss meds and cant see ortho yet she can make an OV. Thanks.

## 2021-09-20 NOTE — TELEPHONE ENCOUNTER
Spoke to pt. C/o severe pain to left hip. States PT not helping. States Tylenol helps a little bit, but not for long. Pt c/o difficulty getting up in the morning. Noted in 8/23/21 OV. 2. Left hip pain  - left hip pain for two months.  No injury or f

## 2021-09-20 NOTE — TELEPHONE ENCOUNTER
XR & Referral ordered. Spoke to pt. Made aware of both. Gave contact information for Central Scheduling & below referral information. Pt voiced understanding.     Alla Burr, [address] [phone]

## 2021-09-20 NOTE — TELEPHONE ENCOUNTER
Patient's PCP ordered left hip xray and patient will have xray done tomorrow. Please order if additional views are needed.     Future Appointments   Date Time Provider Della Stout   9/21/2021  8:15 AM BK XR RM1 BK XRAY [address]   10/15/2021  2:20 PM

## 2021-09-21 ENCOUNTER — HOSPITAL ENCOUNTER (OUTPATIENT)
Dept: GENERAL RADIOLOGY | Age: 76
Discharge: HOME | End: 2021-09-21
Attending: NURSE PRACTITIONER
Payer: MEDICARE

## 2021-09-21 DIAGNOSIS — M25.552 LEFT HIP PAIN: ICD-10-CM

## 2021-09-21 PROCEDURE — 73502 X-RAY EXAM HIP UNI 2-3 VIEWS: CPT | Performed by: NURSE PRACTITIONER

## 2021-09-23 ENCOUNTER — MED REC SCAN ONLY (OUTPATIENT)
Dept: INTERNAL MEDICINE CLINIC | Facility: CLINIC | Age: 76
End: 2021-09-23

## 2021-09-28 ENCOUNTER — TELEPHONE (OUTPATIENT)
Dept: SURGERY | Facility: CLINIC | Age: 76
End: 2021-09-28

## 2021-09-28 DIAGNOSIS — Z87.440 HISTORY OF URINARY TRACT INFECTION: Primary | ICD-10-CM

## 2021-09-28 NOTE — TELEPHONE ENCOUNTER
This RN called patient in response to her call:     \"Per pt has uti. Please advise\"     No answer. Left message that orders placed for need of urine sample and call back office.

## 2021-09-28 NOTE — TELEPHONE ENCOUNTER
Second call. This RN called patient in response to her call:      \"Per pt has uti. Please advise\"      No answer. Left message that orders placed for need of urine sample and call back office.

## 2021-09-30 NOTE — TELEPHONE ENCOUNTER
Third call. This RN called patient in response to her call:      \"Per pt has uti. Please advise\"      No answer.  Left message that orders placed for need of urine sample and call back office.

## 2021-09-30 NOTE — TELEPHONE ENCOUNTER
This RN received a call back from patient complaining of severe UTI. Said, she feels better now because of the antibiotic that Dr Leif Naqvi prescribed. RN reviewed MAR and medications. According to patient, she started taking Amoxicillin 2 days ago.  RN d

## 2021-10-07 ENCOUNTER — NURSE ONLY (OUTPATIENT)
Dept: SURGERY | Facility: CLINIC | Age: 76
End: 2021-10-07
Payer: MEDICARE

## 2021-10-07 ENCOUNTER — TELEPHONE (OUTPATIENT)
Dept: SURGERY | Facility: CLINIC | Age: 76
End: 2021-10-07

## 2021-10-07 DIAGNOSIS — Z87.440 HISTORY OF URINARY TRACT INFECTION: ICD-10-CM

## 2021-10-07 DIAGNOSIS — R82.90 URINE FINDING: Primary | ICD-10-CM

## 2021-10-08 ENCOUNTER — TELEPHONE (OUTPATIENT)
Dept: SURGERY | Facility: CLINIC | Age: 76
End: 2021-10-08

## 2021-10-08 RX ORDER — SULFAMETHOXAZOLE AND TRIMETHOPRIM 800; 160 MG/1; MG/1
1 TABLET ORAL 2 TIMES DAILY
Qty: 20 TABLET | Refills: 1 | Status: SHIPPED | OUTPATIENT
Start: 2021-10-08 | End: 2021-10-18

## 2021-10-08 NOTE — TELEPHONE ENCOUNTER
Per patient's request I left a VM about results. Left VM:    \"Your recent urine culture is starting to show some bacteria growing, 10-50,000 colonies of a gram-negative darian.   This is abnormal.  Recommend begin antibiotic therapy with Bactrim double streng

## 2021-10-08 NOTE — TELEPHONE ENCOUNTER
----- Message from Jeevan Russell MD sent at 10/8/2021 12:42 PM CDT -----  Your recent urine culture is starting to show some bacteria growing, 10-50,000 colonies of a gram-negative darian.   This is abnormal.  Recommend begin antibiotic therapy with [facility]

## 2021-10-08 NOTE — PROGRESS NOTES
Your recent urinalysis is normal.  Recommend follow up in the office as directed.     Sincerely,  Anju Claudio MD

## 2021-10-15 ENCOUNTER — OFFICE VISIT (OUTPATIENT)
Dept: ORTHOPEDICS CLINIC | Facility: CLINIC | Age: 76
End: 2021-10-15
Payer: MEDICARE

## 2021-10-15 VITALS — OXYGEN SATURATION: 96 % | BODY MASS INDEX: 25.99 KG/M2 | HEART RATE: 82 BPM | WEIGHT: 132.38 LBS | HEIGHT: 60 IN

## 2021-10-15 DIAGNOSIS — M54.50 LEFT-SIDED LOW BACK PAIN WITHOUT SCIATICA, UNSPECIFIED CHRONICITY: Primary | ICD-10-CM

## 2021-10-15 PROCEDURE — 99203 OFFICE O/P NEW LOW 30 MIN: CPT | Performed by: ORTHOPAEDIC SURGERY

## 2021-10-15 NOTE — H&P
[facility] New Patient Note    CC: Patient presents with:  Hip Pain: left hip      HPI: This 77year old female presents today with complaints of left hip pain.   When asked where this pain occurs, patient grabs the flank/low back region on the left, Problem Relation Age of Onset   • Breast Cancer Sister 40     Social History    Occupational History      Not on file    Tobacco Use      Smoking status: Current Every Day Smoker        Packs/day: 0.25      Smokeless tobacco: Never Used    Vaping Use potential etiologies for the patient's symptoms. Given location and history of pain, imaging findings, the hip joint does not appear to be the etiology for the patient's symptoms.   We did discuss other possible contributors to hip pain, including musculot

## 2021-10-18 ENCOUNTER — TELEPHONE (OUTPATIENT)
Dept: INTERNAL MEDICINE CLINIC | Facility: CLINIC | Age: 76
End: 2021-10-18

## 2021-10-18 DIAGNOSIS — M25.552 LEFT HIP PAIN: Primary | ICD-10-CM

## 2021-10-18 NOTE — TELEPHONE ENCOUNTER
Provider's Name?:  Delphine Miller Specialty?:  Neuro / physiatrist   Reason for Visit?:  Hips   Diagnosis?:    Number of Visits Requested?:  2  Last Visit with Specialist?:    Is Appt.  Already Scheduled?:         If so

## 2021-10-18 NOTE — TELEPHONE ENCOUNTER
Is pt up to date on OVs with PCP?: yes  Has pt been seen/referred for condition?: yes  Is specialist in network?: yes  Health Maintenance up to date?: yes      Referral placed  Message sent to [facility]  Arley pending  To notify pt

## 2021-10-19 NOTE — TELEPHONE ENCOUNTER
Phong Armando RN  Good Morning Mignon,     Referral has been updated and approved.      Thank you,   Tracey Heart notified

## 2021-10-20 ENCOUNTER — TELEPHONE (OUTPATIENT)
Dept: PHYSICAL MEDICINE AND REHAB | Facility: CLINIC | Age: 76
End: 2021-10-20

## 2021-10-20 ENCOUNTER — OFFICE VISIT (OUTPATIENT)
Dept: PHYSICAL MEDICINE AND REHAB | Facility: CLINIC | Age: 76
End: 2021-10-20
Payer: MEDICARE

## 2021-10-20 VITALS
BODY MASS INDEX: 25.24 KG/M2 | OXYGEN SATURATION: 92 % | SYSTOLIC BLOOD PRESSURE: 110 MMHG | DIASTOLIC BLOOD PRESSURE: 62 MMHG | WEIGHT: 132 LBS | HEIGHT: 60.5 IN | HEART RATE: 66 BPM

## 2021-10-20 DIAGNOSIS — G89.29 CHRONIC BILATERAL LOW BACK PAIN WITHOUT SCIATICA: ICD-10-CM

## 2021-10-20 DIAGNOSIS — M54.50 CHRONIC BILATERAL LOW BACK PAIN WITHOUT SCIATICA: ICD-10-CM

## 2021-10-20 DIAGNOSIS — Z98.890 HISTORY OF LUMBAR SURGERY: ICD-10-CM

## 2021-10-20 DIAGNOSIS — M54.50 CHRONIC BILATERAL LOW BACK PAIN WITHOUT SCIATICA: Primary | ICD-10-CM

## 2021-10-20 DIAGNOSIS — G89.29 CHRONIC BILATERAL LOW BACK PAIN WITHOUT SCIATICA: Primary | ICD-10-CM

## 2021-10-20 PROCEDURE — 99204 OFFICE O/P NEW MOD 45 MIN: CPT | Performed by: PHYSICAL MEDICINE & REHABILITATION

## 2021-10-20 RX ORDER — MELOXICAM 15 MG/1
TABLET ORAL
Qty: 90 TABLET | Refills: 0 | OUTPATIENT
Start: 2021-10-20

## 2021-10-20 RX ORDER — MELOXICAM 15 MG/1
TABLET ORAL
Qty: 30 TABLET | Refills: 0 | Status: SHIPPED | OUTPATIENT
Start: 2021-10-20 | End: 2021-12-27

## 2021-10-20 NOTE — PROGRESS NOTES
History and Physical    C/C: low back pain    HPI: 77year old female with history of depression presents with chronic persistent low back pain that worsens with standing and walking. She is able to walk further distances with a shopping cart.  No radiating /62  HR 66  O2 sat 92%    Lumbar spine exam:    No pain with lumbar flexion. + pain with lumbar extension. Mild tenderness to palpation lumbar paraspinals. No ttp PSIS.   5/5 LE strength b/l in HF, KE, ADF, EHL, ankle eversion  SILT b/l LE  2/4 quad

## 2021-10-20 NOTE — TELEPHONE ENCOUNTER
This encounter is now closed. This RN called Jose Alfredo to inquire if patient picked up the recent order of Bactrim. Spoke to Adriana. She said patient picked up the medication on 10/9.

## 2021-10-20 NOTE — TELEPHONE ENCOUNTER
Initiated authorization for L-Spine MRI w+wo CPT 19487 to be done at [facility] with [employer] online  Tracking #[id number].     Status: Approved with authorization #[id number] valid 10/20/21-11/19/21    Patient notified of the above and will call to schedule on

## 2021-11-01 ENCOUNTER — TELEPHONE (OUTPATIENT)
Dept: INTERNAL MEDICINE CLINIC | Facility: CLINIC | Age: 76
End: 2021-11-01

## 2021-11-01 DIAGNOSIS — H26.9 CATARACT OF BOTH EYES, UNSPECIFIED CATARACT TYPE: Primary | ICD-10-CM

## 2021-11-01 NOTE — TELEPHONE ENCOUNTER
Patient spoke with the  at Dr Rian Ahsley office [facility]) about a bill that was not covered. Dr Rian Ashley office told patient that they need authorization for referrals.   Per patient, Dr Rian Ashley office wants our office to call them a

## 2021-11-02 NOTE — TELEPHONE ENCOUNTER
Spoke with Nellie Nissen at [facility] in billing dept  Pt had OV with Dr. Dru Allen on 9/2/21    New referral placed  Message sent to [address] [place] [address]  When approved please fax referral to fax# [phone], attn to Nellie Nissen

## 2021-11-02 NOTE — TELEPHONE ENCOUNTER
[facility]iallo [facility]  [facility]     This referral is updated and approved.      Thank you,   [place] to send fax as it keeps failing  Left message on VM and notified of referral approval

## 2021-11-03 NOTE — TELEPHONE ENCOUNTER
Spoke to Tari Lunsford from [employer]. Provided Certificate (approval) number = [id number]  Effective 9/21/21 - 3/1/22 for 6 visits. Tari Lunsford confirmed above information. Tari Lunsford advised to try faxing again.     Tried to re-fax to [phone]  Fail

## 2021-11-03 NOTE — TELEPHONE ENCOUNTER
Rama Gaston stated that they needed the referral to have start date of 9/2/21-1/31/22.  Please fax back to Rama Gaston at TML#[phone]  Please make change   Thank you

## 2021-11-03 NOTE — TELEPHONE ENCOUNTER
Keyur Newman, RN  Hi Mignon,     Ret[employer] approval has been obtained and updated in referral.     Thank you,   Supriya Vasquez     unable to fax the referral as it is keep failing  Left detailed message on VM for David Verma regarding referral is approved f

## 2021-11-08 ENCOUNTER — HOSPITAL ENCOUNTER (OUTPATIENT)
Dept: MRI IMAGING | Facility: HOSPITAL | Age: 76
Discharge: HOME | End: 2021-11-08
Attending: PHYSICAL MEDICINE & REHABILITATION
Payer: MEDICARE

## 2021-11-08 DIAGNOSIS — Z98.890 HISTORY OF LUMBAR SURGERY: ICD-10-CM

## 2021-11-08 DIAGNOSIS — G89.29 CHRONIC BILATERAL LOW BACK PAIN WITHOUT SCIATICA: ICD-10-CM

## 2021-11-08 DIAGNOSIS — M54.50 CHRONIC BILATERAL LOW BACK PAIN WITHOUT SCIATICA: ICD-10-CM

## 2021-11-08 PROCEDURE — 72158 MRI LUMBAR SPINE W/O & W/DYE: CPT | Performed by: PHYSICAL MEDICINE & REHABILITATION

## 2021-11-08 PROCEDURE — A9575 INJ GADOTERATE MEGLUMI 0.1ML: HCPCS | Performed by: PHYSICAL MEDICINE & REHABILITATION

## 2021-11-16 ENCOUNTER — TELEPHONE (OUTPATIENT)
Dept: NEUROLOGY | Facility: CLINIC | Age: 76
End: 2021-11-16

## 2021-11-16 NOTE — TELEPHONE ENCOUNTER
----- Message from Tonya Edmond sent at 11/12/2021 12:05 PM CST -----    ----- Message -----  From: Opal Moody DO  Sent: 11/12/2021  12:02 PM CST  To: Garrett Hey Nurse    MRI reviewed.   Please let the patient know she has mild stenosis or narrowi

## 2021-11-22 ENCOUNTER — LAB ENCOUNTER (OUTPATIENT)
Dept: LAB | Age: 76
End: 2021-11-22
Attending: NURSE PRACTITIONER
Payer: MEDICARE

## 2021-11-22 DIAGNOSIS — R71.8 ELEVATED MCV: ICD-10-CM

## 2021-11-22 DIAGNOSIS — E78.2 MIXED HYPERLIPIDEMIA: ICD-10-CM

## 2021-11-22 PROCEDURE — 82746 ASSAY OF FOLIC ACID SERUM: CPT

## 2021-11-22 PROCEDURE — 36415 COLL VENOUS BLD VENIPUNCTURE: CPT

## 2021-11-22 PROCEDURE — 85025 COMPLETE CBC W/AUTO DIFF WBC: CPT

## 2021-11-22 PROCEDURE — 82607 VITAMIN B-12: CPT

## 2021-11-22 PROCEDURE — 80061 LIPID PANEL: CPT

## 2021-11-22 PROCEDURE — 80053 COMPREHEN METABOLIC PANEL: CPT

## 2021-11-23 DIAGNOSIS — M54.50 CHRONIC BILATERAL LOW BACK PAIN WITHOUT SCIATICA: ICD-10-CM

## 2021-11-23 DIAGNOSIS — Z98.890 HISTORY OF LUMBAR SURGERY: ICD-10-CM

## 2021-11-23 DIAGNOSIS — G89.29 CHRONIC BILATERAL LOW BACK PAIN WITHOUT SCIATICA: ICD-10-CM

## 2021-11-23 RX ORDER — ATORVASTATIN CALCIUM 20 MG/1
TABLET, FILM COATED ORAL
Qty: 90 TABLET | Refills: 0 | Status: SHIPPED | OUTPATIENT
Start: 2021-11-23

## 2021-11-23 NOTE — PROGRESS NOTES
Patient MyChart result sent, patient viewed.  Mychart message sent in addition to lab result message

## 2021-11-24 RX ORDER — MELOXICAM 15 MG/1
TABLET ORAL
Qty: 30 TABLET | Refills: 0 | OUTPATIENT
Start: 2021-11-24

## 2021-11-24 NOTE — TELEPHONE ENCOUNTER
Spoke to patient state it did not help at all. So no refill is needed. She has a appointment for 12/1/21 so will talk to you then.

## 2021-12-01 ENCOUNTER — OFFICE VISIT (OUTPATIENT)
Dept: PHYSICAL MEDICINE AND REHAB | Facility: CLINIC | Age: 76
End: 2021-12-01
Payer: MEDICARE

## 2021-12-01 VITALS
HEART RATE: 88 BPM | DIASTOLIC BLOOD PRESSURE: 68 MMHG | HEIGHT: 60.5 IN | OXYGEN SATURATION: 93 % | WEIGHT: 132 LBS | SYSTOLIC BLOOD PRESSURE: 128 MMHG | BODY MASS INDEX: 25.24 KG/M2

## 2021-12-01 DIAGNOSIS — M54.6 CHRONIC BILATERAL THORACIC BACK PAIN: Primary | ICD-10-CM

## 2021-12-01 DIAGNOSIS — G89.29 CHRONIC BILATERAL THORACIC BACK PAIN: Primary | ICD-10-CM

## 2021-12-01 DIAGNOSIS — M89.8X1 CHRONIC SCAPULAR PAIN: ICD-10-CM

## 2021-12-01 DIAGNOSIS — G89.29 CHRONIC SCAPULAR PAIN: ICD-10-CM

## 2021-12-01 DIAGNOSIS — M53.3 PAIN OF BOTH SACROILIAC JOINTS: ICD-10-CM

## 2021-12-01 PROCEDURE — 99214 OFFICE O/P EST MOD 30 MIN: CPT | Performed by: PHYSICAL MEDICINE & REHABILITATION

## 2021-12-04 NOTE — PROGRESS NOTES
Progress note    C/C: back, scapular pain    HPI: 76 yo f presents for f/u. Underwent MRI of the lumbar spine, returns to discuss results. Bilateral scapular and thoracic pain is worse than lower back pain. LBP worse with standing, walking.  Lower back pain desiccation, endplate spurring, and   degenerative endplate marrow signal changes again noted throughout the lumbar spine with scattered vacuum disc phenomenon.  No focal worrisome marrow signal abnormality is seen.       The distal spinal cord and conus m significant spinal canal stenosis identified. Talha Mins is likely at least mild bilateral neural foraminal stenosis, however the neural foramina are limited in evaluation due to susceptibility artifact.                            Impression   CONCLUSION:

## 2021-12-07 ENCOUNTER — TELEPHONE (OUTPATIENT)
Dept: SURGERY | Facility: CLINIC | Age: 76
End: 2021-12-07

## 2021-12-07 DIAGNOSIS — R82.90 URINE FINDING: Primary | ICD-10-CM

## 2021-12-07 NOTE — TELEPHONE ENCOUNTER
RN received a call from patient. Reports pain and burning sensation when she urinates. Reports lack of sleep also and has no ride today.     RN reviewed patient's medlist. RN instructed patient to take AZO for now, give urine sample, continue with fluid int

## 2021-12-07 NOTE — TELEPHONE ENCOUNTER
Patient states she has a bad urine infection and requesting rx to treat. Please call at [phone],HUSOPH.

## 2021-12-07 NOTE — TELEPHONE ENCOUNTER
This RN called patient back. No answer. Left message to call office. Please transfer call to ext [phone]    RN placed U/A and Cx orders.

## 2021-12-08 ENCOUNTER — LAB ENCOUNTER (OUTPATIENT)
Dept: LAB | Age: 76
End: 2021-12-08
Attending: UROLOGY
Payer: MEDICARE

## 2021-12-08 ENCOUNTER — TELEPHONE (OUTPATIENT)
Dept: SURGERY | Facility: CLINIC | Age: 76
End: 2021-12-08

## 2021-12-08 DIAGNOSIS — Z87.440 HISTORY OF URINARY TRACT INFECTION: Primary | ICD-10-CM

## 2021-12-08 DIAGNOSIS — R82.90 URINE FINDING: ICD-10-CM

## 2021-12-08 PROCEDURE — 81001 URINALYSIS AUTO W/SCOPE: CPT | Performed by: UROLOGY

## 2021-12-08 PROCEDURE — 87086 URINE CULTURE/COLONY COUNT: CPT | Performed by: UROLOGY

## 2021-12-08 PROCEDURE — 87088 URINE BACTERIA CULTURE: CPT | Performed by: UROLOGY

## 2021-12-08 PROCEDURE — 87186 SC STD MICRODIL/AGAR DIL: CPT | Performed by: UROLOGY

## 2021-12-08 NOTE — TELEPHONE ENCOUNTER
Spoke with patient adv of abnormal urinalysis and adv. Doctor Santana Matthews placed an ordered for urine culture. Patient understood and agreed to the plan.

## 2021-12-08 NOTE — TELEPHONE ENCOUNTER
Patient aware and urine culture is pending      ----- Message from Mikaela Balderrama MD sent at 12/8/2021 12:51 PM CST -----  Your recent urinalysis shows bacteria and is abnormal.  Recommend urine culture.     Sincerely,  Edinson Ruiz MD

## 2021-12-09 ENCOUNTER — MED REC SCAN ONLY (OUTPATIENT)
Dept: PHYSICAL MEDICINE AND REHAB | Facility: CLINIC | Age: 76
End: 2021-12-09

## 2021-12-09 ENCOUNTER — LAB ENCOUNTER (OUTPATIENT)
Dept: LAB | Age: 76
End: 2021-12-09
Attending: UROLOGY
Payer: MEDICARE

## 2021-12-09 RX ORDER — SULFAMETHOXAZOLE AND TRIMETHOPRIM 800; 160 MG/1; MG/1
1 TABLET ORAL 2 TIMES DAILY
Qty: 20 TABLET | Refills: 0 | Status: SHIPPED | OUTPATIENT
Start: 2021-12-09 | End: 2021-12-19

## 2021-12-09 NOTE — TELEPHONE ENCOUNTER
RN called patient to relay results and MD's recommendations. No answer. Left message to call back office.      Note, Bactrim order sent to pharmacy today, 12/9.       12/9/2021 11:37 AM CST          Your recent urine culture is abnormal.  Shows E. coli bact

## 2021-12-09 NOTE — TELEPHONE ENCOUNTER
RN received a call back from this patient. RN explained that MD ordered Bactrim. Instructions given to complete culture a week after completing the antibiotic. Patient prefers to see MD/HARMAN instead. Appt offered. She verbalized understanding to plans.

## 2021-12-09 NOTE — TELEPHONE ENCOUNTER
Second call. RN called patient to relay results and MD's recommendations. No answer. Left message to call back office.      Note, Bactrim order sent to pharmacy today, 12/9.

## 2021-12-14 ENCOUNTER — TELEPHONE (OUTPATIENT)
Dept: PHYSICAL THERAPY | Facility: HOSPITAL | Age: 76
End: 2021-12-14

## 2021-12-14 ENCOUNTER — OFFICE VISIT (OUTPATIENT)
Dept: PHYSICAL THERAPY | Age: 76
End: 2021-12-14
Attending: PHYSICAL MEDICINE & REHABILITATION
Payer: MEDICARE

## 2021-12-14 PROCEDURE — 97161 PT EVAL LOW COMPLEX 20 MIN: CPT

## 2021-12-14 PROCEDURE — 97110 THERAPEUTIC EXERCISES: CPT

## 2021-12-14 NOTE — PROGRESS NOTES
SPINE EVALUATION:   Referring Physician: Dr. Ze Santo  Diagnosis: Myofascial Postural Impairments affecting overall functionality     Date of Service: 12/14/2021     PATIENT Todd Foy is a 77year old y/o female who presents to therapy to kyphosis and lumbar lordosis. Mild increase in anterior pelvic tilt.    Gait: Decreased hip extension bilaterally with decrease in reciprocal arm swing  Neuro Screen: The patient was assessed for MMT, sensation, and reflexes per all myotomes and dermatomes OF CARE:    Goals (12 visits):    1. Increase FOTO assessment > 11% from INE to DC. 2. Patient will be aware of postural limitations and be able to correct them independently.     3. Patient will have an increase in spinal mobility to panfree symmetry bila

## 2021-12-16 ENCOUNTER — OFFICE VISIT (OUTPATIENT)
Dept: PHYSICAL THERAPY | Age: 76
End: 2021-12-16
Attending: PHYSICAL MEDICINE & REHABILITATION
Payer: MEDICARE

## 2021-12-21 ENCOUNTER — OFFICE VISIT (OUTPATIENT)
Dept: PHYSICAL THERAPY | Age: 76
End: 2021-12-21
Attending: PHYSICAL MEDICINE & REHABILITATION
Payer: MEDICARE

## 2021-12-21 DIAGNOSIS — G89.29 CHRONIC BILATERAL THORACIC BACK PAIN: ICD-10-CM

## 2021-12-21 DIAGNOSIS — M54.6 CHRONIC BILATERAL THORACIC BACK PAIN: ICD-10-CM

## 2021-12-21 DIAGNOSIS — M53.3 PAIN OF BOTH SACROILIAC JOINTS: ICD-10-CM

## 2021-12-21 PROCEDURE — 97110 THERAPEUTIC EXERCISES: CPT

## 2021-12-21 PROCEDURE — 97140 MANUAL THERAPY 1/> REGIONS: CPT

## 2021-12-21 NOTE — PROGRESS NOTES
Dx: Postural Syndrome         Authorized # of Visits:  12         Next MD visit: none scheduled  Fall Risk: standard         Precautions: n/a      Last MD Note: 12/14/2021     Goal Number: 12    Subjective: States that the shoulder and cervical spine are s

## 2021-12-27 ENCOUNTER — OFFICE VISIT (OUTPATIENT)
Dept: SURGERY | Facility: CLINIC | Age: 76
End: 2021-12-27
Payer: MEDICARE

## 2021-12-27 DIAGNOSIS — N20.0 NEPHROLITHIASIS: ICD-10-CM

## 2021-12-27 DIAGNOSIS — R35.1 NOCTURIA: ICD-10-CM

## 2021-12-27 DIAGNOSIS — N39.0 RECURRENT UTI: Primary | ICD-10-CM

## 2021-12-27 PROCEDURE — 99214 OFFICE O/P EST MOD 30 MIN: CPT | Performed by: PHYSICIAN ASSISTANT

## 2021-12-27 PROCEDURE — 81003 URINALYSIS AUTO W/O SCOPE: CPT | Performed by: PHYSICIAN ASSISTANT

## 2021-12-27 RX ORDER — KETOROLAC TROMETHAMINE 10 MG/1
TABLET, FILM COATED ORAL
COMMUNITY
Start: 2021-12-15 | End: 2021-12-27

## 2021-12-27 RX ORDER — METHENAMINE HIPPURATE 1000 MG/1
1 TABLET ORAL DAILY
Qty: 90 TABLET | Refills: 3 | Status: SHIPPED | OUTPATIENT
Start: 2021-12-27

## 2021-12-27 RX ORDER — ALLOPURINOL 300 MG/1
TABLET ORAL
COMMUNITY
Start: 2021-12-15

## 2021-12-27 RX ORDER — CHOLESTYRAMINE LIGHT 4 G/5.7G
POWDER, FOR SUSPENSION ORAL
COMMUNITY
Start: 2021-12-21

## 2021-12-27 RX ORDER — MIRABEGRON 50 MG/1
50 TABLET, FILM COATED, EXTENDED RELEASE ORAL DAILY
Qty: 90 TABLET | Refills: 3 | Status: SHIPPED | OUTPATIENT
Start: 2021-12-27

## 2021-12-27 RX ORDER — NITROFURANTOIN 25; 75 MG/1; MG/1
100 CAPSULE ORAL 2 TIMES DAILY
Qty: 20 CAPSULE | Refills: 0 | Status: SHIPPED | OUTPATIENT
Start: 2021-12-27 | End: 2022-01-06

## 2021-12-27 NOTE — PATIENT INSTRUCTIONS
Urinary tract infections can affect your general health and your quality of life. Some UTI’s can be prevented. Here are some suggestions that my help prevent frequent UTI’s. Good Hygiene: Always wipe front to back. Don’t use perfumed soaps.   Plain s Flaxseed, All-Bran cereal, Fiber One bars, fruits and vegetables are a good sources of fiber. Alternatively, Metamucil can be used daily.   Gentle laxatives and stool softeners may be added on a daily or as needed basis if necessary (Miralax, Colace, Peric

## 2021-12-28 ENCOUNTER — TELEPHONE (OUTPATIENT)
Dept: PHYSICAL THERAPY | Age: 76
End: 2021-12-28

## 2021-12-28 ENCOUNTER — APPOINTMENT (OUTPATIENT)
Dept: PHYSICAL THERAPY | Age: 76
End: 2021-12-28
Attending: PHYSICAL MEDICINE & REHABILITATION
Payer: MEDICARE

## 2022-01-03 ENCOUNTER — OFFICE VISIT (OUTPATIENT)
Dept: PHYSICAL THERAPY | Age: 77
End: 2022-01-03
Attending: PHYSICAL MEDICINE & REHABILITATION
Payer: MEDICARE

## 2022-01-03 DIAGNOSIS — G89.29 CHRONIC BILATERAL THORACIC BACK PAIN: ICD-10-CM

## 2022-01-03 DIAGNOSIS — M53.3 PAIN OF BOTH SACROILIAC JOINTS: ICD-10-CM

## 2022-01-03 DIAGNOSIS — M54.6 CHRONIC BILATERAL THORACIC BACK PAIN: ICD-10-CM

## 2022-01-03 PROCEDURE — 97110 THERAPEUTIC EXERCISES: CPT

## 2022-01-03 PROCEDURE — 97140 MANUAL THERAPY 1/> REGIONS: CPT

## 2022-01-03 NOTE — PROGRESS NOTES
Dx: Postural Syndrome         Authorized # of Visits:  12         Next MD visit: none scheduled  Fall Risk: standard         Precautions: n/a      Last MD Note: 12/14/2021     Goal Number: 12    Subjective: Still some soreness. Feeling better overall. Treatment Time: 40 min

## 2022-01-06 ENCOUNTER — OFFICE VISIT (OUTPATIENT)
Dept: PHYSICAL THERAPY | Age: 77
End: 2022-01-06
Attending: PHYSICAL MEDICINE & REHABILITATION
Payer: MEDICARE

## 2022-01-06 PROCEDURE — 97110 THERAPEUTIC EXERCISES: CPT

## 2022-01-06 PROCEDURE — 97140 MANUAL THERAPY 1/> REGIONS: CPT

## 2022-01-06 NOTE — PROGRESS NOTES
Dx: Postural Syndrome         Authorized # of Visits:  12         Next MD visit: none scheduled  Fall Risk: standard         Precautions: n/a      Last MD Note: 12/14/2021     Goal Number: 12    Subjective: Neck and arms are better.   Having some rib pain l to return to sustained ambulation and posturing. Plan: Assess response to today's treatment and progress as tolerated. Charges: TherEx 2 (25 min);  Manual PT 1 (15 min)      Total Timed Treatment: 40 min  Total Treatment Time: 40 min

## 2022-01-10 ENCOUNTER — TELEPHONE (OUTPATIENT)
Dept: SURGERY | Facility: CLINIC | Age: 77
End: 2022-01-10

## 2022-01-10 ENCOUNTER — OFFICE VISIT (OUTPATIENT)
Dept: PHYSICAL THERAPY | Age: 77
End: 2022-01-10
Attending: PHYSICAL MEDICINE & REHABILITATION
Payer: MEDICARE

## 2022-01-10 PROCEDURE — 97140 MANUAL THERAPY 1/> REGIONS: CPT

## 2022-01-10 PROCEDURE — 97110 THERAPEUTIC EXERCISES: CPT

## 2022-01-10 NOTE — TELEPHONE ENCOUNTER
S/w patient.   Prefers OV  Future Appointments   Date Time Provider Della Stout   1/11/2022  9:30 AM BK XR RM1 [facility] [address]   1/11/2022 10:00 AM BK XR RM1 [facility] [address]   1/11/2022 10:15 AM BK XR RM1 BK XRAY [address]   1/11/2022 10:45 AM SRIRAM

## 2022-01-10 NOTE — PROGRESS NOTES
Dx: Postural Syndrome         Authorized # of Visits:  12         Next MD visit: none scheduled  Fall Risk: standard         Precautions: n/a      Last MD Note: 12/14/2021     Goal Number: 12    Subjective: Neck and arms are better.   Having some rib pain l sitting and walking postures. .    4. Patient will have an increase in core strength to assist with returning to sustained sitting and ambulation  5.  Patient will demonstrate an increase in MLT of the anterior thorax and anterior hip musculatre in order t

## 2022-01-11 ENCOUNTER — OFFICE VISIT (OUTPATIENT)
Dept: SURGERY | Facility: CLINIC | Age: 77
End: 2022-01-11
Payer: MEDICARE

## 2022-01-11 ENCOUNTER — HOSPITAL ENCOUNTER (OUTPATIENT)
Dept: GENERAL RADIOLOGY | Age: 77
Discharge: HOME | End: 2022-01-11
Attending: PHYSICAL MEDICINE & REHABILITATION
Payer: MEDICARE

## 2022-01-11 ENCOUNTER — HOSPITAL ENCOUNTER (OUTPATIENT)
Dept: GENERAL RADIOLOGY | Age: 77
Discharge: HOME | End: 2022-01-11
Attending: PHYSICIAN ASSISTANT
Payer: MEDICARE

## 2022-01-11 DIAGNOSIS — G89.29 CHRONIC SCAPULAR PAIN: ICD-10-CM

## 2022-01-11 DIAGNOSIS — M54.6 CHRONIC BILATERAL THORACIC BACK PAIN: ICD-10-CM

## 2022-01-11 DIAGNOSIS — M89.8X1 CHRONIC SCAPULAR PAIN: ICD-10-CM

## 2022-01-11 DIAGNOSIS — R35.0 URINARY FREQUENCY: ICD-10-CM

## 2022-01-11 DIAGNOSIS — N39.0 RECURRENT UTI: Primary | ICD-10-CM

## 2022-01-11 DIAGNOSIS — G89.29 CHRONIC BILATERAL THORACIC BACK PAIN: ICD-10-CM

## 2022-01-11 DIAGNOSIS — N20.0 NEPHROLITHIASIS: ICD-10-CM

## 2022-01-11 PROCEDURE — 74018 RADEX ABDOMEN 1 VIEW: CPT | Performed by: PHYSICIAN ASSISTANT

## 2022-01-11 PROCEDURE — 73030 X-RAY EXAM OF SHOULDER: CPT | Performed by: PHYSICAL MEDICINE & REHABILITATION

## 2022-01-11 PROCEDURE — 99212 OFFICE O/P EST SF 10 MIN: CPT | Performed by: PHYSICIAN ASSISTANT

## 2022-01-11 PROCEDURE — 72072 X-RAY EXAM THORAC SPINE 3VWS: CPT | Performed by: PHYSICAL MEDICINE & REHABILITATION

## 2022-01-11 NOTE — PATIENT INSTRUCTIONS
Take nitrofurantoin if needed for UTI symptoms. Take methenamine 1 tablet daily for prevention of UTIs. Take Myrbetriq 50mg 1 tablet daily for urinary frequency.  Okay to continue Oxybutynin, but if your urinary frequency is better when you start myrb

## 2022-01-11 NOTE — PROGRESS NOTES
Subjective:   Bessie Grimaldo is a 77year old female who presents for Follow - Up to review her medications. She was seen a few weeks ago and hasn't started myrbetriq. Symptoms largely unchanged for last visit.  Comes today just to review what medications tablet 1   • Sertraline HCl 100 MG Oral Tab Take 2 tablets (200 mg total) by mouth daily. 180 tablet 1   • Oxybutynin Chloride ER 10 MG Oral Tablet 24 Hr Take 1 tablet (10 mg total) by mouth daily.  90 tablet 3   • acetaminophen 500 MG Oral Tab Take 500 mg Negative Negative  --    KETUR  --   --   --   --   --   --   --  Negative Negative  --  Negative  --  Negative  --  Negative Negative  --    BILUR  --   --   --   --   --   --   --  Negative Negative  --  Negative  --  Negative  --  Negative Negative  -- BOOK, XR, XR ABDOMEN (1 VIEW) (CPT=74018), 9/11/2020, 2:51 PM.  TECHNIQUE:  Supine AP view was obtained. PATIENT STATED HISTORY: (As transcribed by Technologist)  History of kidney stones.     FINDINGS:  Multiple punctate calcifications are seen to the rig are demineralized. There is disc space narrowing seen throughout the thoracic spine most prominent in the mid lower thoracic spine. Atherosclerotic calcifications are seen within the aorta.    Degenerative endplate changes and disc disease is seen at C5-6 INDICATIONS:  G89.29 Chronic scapular pain M89.8X1 Chronic scapular pain  PATIENT STATED HISTORY: (As transcribed by Technologist)  Bilateral shoulder and back pain for two months.     FINDINGS:  Compared to the previous exam there is worsening narrowing of

## 2022-01-12 ENCOUNTER — TELEPHONE (OUTPATIENT)
Dept: PHYSICAL MEDICINE AND REHAB | Facility: CLINIC | Age: 77
End: 2022-01-12

## 2022-01-12 DIAGNOSIS — M12.811 RIGHT ROTATOR CUFF TEAR ARTHROPATHY: Primary | ICD-10-CM

## 2022-01-12 DIAGNOSIS — M19.012 OSTEOARTHRITIS OF LEFT SHOULDER, UNSPECIFIED OSTEOARTHRITIS TYPE: ICD-10-CM

## 2022-01-12 DIAGNOSIS — M75.101 RIGHT ROTATOR CUFF TEAR ARTHROPATHY: Primary | ICD-10-CM

## 2022-01-13 ENCOUNTER — APPOINTMENT (OUTPATIENT)
Dept: PHYSICAL THERAPY | Age: 77
End: 2022-01-13
Attending: PHYSICAL MEDICINE & REHABILITATION
Payer: MEDICARE

## 2022-01-17 ENCOUNTER — OFFICE VISIT (OUTPATIENT)
Dept: PHYSICAL THERAPY | Age: 77
End: 2022-01-17
Attending: PHYSICAL MEDICINE & REHABILITATION
Payer: MEDICARE

## 2022-01-17 PROCEDURE — 97110 THERAPEUTIC EXERCISES: CPT

## 2022-01-17 PROCEDURE — 97140 MANUAL THERAPY 1/> REGIONS: CPT

## 2022-01-17 NOTE — PROGRESS NOTES
Dx: Postural Syndrome         Authorized # of Visits:  12         Next MD visit: none scheduled  Fall Risk: standard         Precautions: n/a      Last MD Note: 12/14/2021     Goal Number: 12    Subjective: Comes in today concerned about her radiographs. has improved with less pain today. Increased stability at the posterior scapular region with exercises. Goals (12 visits):    1. Increase FOTO assessment > 11% from INE to DC.   2. Patient will be aware of postural limitations and be able to correct

## 2022-01-20 ENCOUNTER — OFFICE VISIT (OUTPATIENT)
Dept: PHYSICAL THERAPY | Age: 77
End: 2022-01-20
Attending: PHYSICAL MEDICINE & REHABILITATION
Payer: MEDICARE

## 2022-01-20 PROCEDURE — 97110 THERAPEUTIC EXERCISES: CPT

## 2022-01-20 PROCEDURE — 97140 MANUAL THERAPY 1/> REGIONS: CPT

## 2022-01-20 NOTE — PROGRESS NOTES
Dx: Postural Syndrome         Authorized # of Visits:  12         Next MD visit: none scheduled  Fall Risk: standard         Precautions: n/a      Last MD Note: 12/14/2021     Goal Number: 12    Subjective: States 2 night ago she had severe pain into her l manipulation Seated thoracic PA manipulation Seated thoracic PA manipulation        Mid thoracic PA rib manipulation Mid thoracic PA rib manipulation      Assessment: The patient responded well to today's intervention. Less pain after manual PT today.

## 2022-01-20 NOTE — TELEPHONE ENCOUNTER
Left voice mail message for patient advising Dr Haydee Carranza did write orders for PT. Chart review has PT already scheduled with Kain Valera. Advised to call office for any questions.

## 2022-01-24 ENCOUNTER — OFFICE VISIT (OUTPATIENT)
Dept: SURGERY | Facility: CLINIC | Age: 77
End: 2022-01-24
Payer: MEDICARE

## 2022-01-24 DIAGNOSIS — Z87.440 HISTORY OF URINARY TRACT INFECTION: ICD-10-CM

## 2022-01-24 DIAGNOSIS — R82.90 URINE FINDING: Primary | ICD-10-CM

## 2022-01-24 DIAGNOSIS — R35.0 URINARY FREQUENCY: ICD-10-CM

## 2022-01-24 DIAGNOSIS — N28.89: ICD-10-CM

## 2022-01-24 DIAGNOSIS — R35.1 NOCTURIA: ICD-10-CM

## 2022-01-24 PROCEDURE — 99213 OFFICE O/P EST LOW 20 MIN: CPT | Performed by: UROLOGY

## 2022-01-24 PROCEDURE — 81003 URINALYSIS AUTO W/O SCOPE: CPT | Performed by: UROLOGY

## 2022-01-24 NOTE — PROGRESS NOTES
Rooming Clinician:     Shalini Atkinson is a 77year old female. Patient presents with: Follow - Up: here to discuss KUB. Forgot her voiding diary.  Asking if it's ok to take both oxybutynin and myrbetriq        HPI:     She has history of large right re hyperlipidemia    • Visual impairment     reading glasses     History reviewed. No pertinent surgical history.    Social History:  Social History    Tobacco Use      Smoking status: Current Every Day Smoker        Packs/day: 0.25      Smokeless tobacco: Nev TECHNIQUE:  Supine AP view was obtained. PATIENT STATED HISTORY: (As transcribed by Technologist)  History of kidney stones.     FINDINGS:  Multiple punctate calcifications are seen to the right of midline in the lower pelvis consistent with known uterine

## 2022-01-25 ENCOUNTER — OFFICE VISIT (OUTPATIENT)
Dept: PHYSICAL THERAPY | Age: 77
End: 2022-01-25
Attending: PHYSICAL MEDICINE & REHABILITATION
Payer: MEDICARE

## 2022-01-25 PROCEDURE — 97140 MANUAL THERAPY 1/> REGIONS: CPT

## 2022-01-25 PROCEDURE — 97110 THERAPEUTIC EXERCISES: CPT

## 2022-01-27 DIAGNOSIS — F39 MOOD DISORDER (HCC): ICD-10-CM

## 2022-01-27 RX ORDER — TRAZODONE HYDROCHLORIDE 150 MG/1
TABLET ORAL
Qty: 90 TABLET | Refills: 0 | Status: SHIPPED | OUTPATIENT
Start: 2022-01-27

## 2022-01-27 RX ORDER — SERTRALINE HYDROCHLORIDE 100 MG/1
TABLET, FILM COATED ORAL
Qty: 180 TABLET | Refills: 0 | Status: SHIPPED | OUTPATIENT
Start: 2022-01-27

## 2022-01-27 NOTE — TELEPHONE ENCOUNTER
Last OV relevant to medication: 7/23/21 has been seen more recently  Last refill date: 7/23/21     #/refills: 90-1  When pt was asked to return for OV: 6 months for follow up  Upcoming appt/reason: 2/21/22 6 month follow up  Was pt informed of any over due

## 2022-01-27 NOTE — TELEPHONE ENCOUNTER
Last OV relevant to medication: 7/23/2021 (has been seen more recently)  Last refill date: 7/23/2021    #/refills: 180-1  When pt was asked to return for OV: 6 moths for follow up  Upcoming appt/reason: 2/21/2022 6 month follow up   Was pt informed of any

## 2022-01-31 ENCOUNTER — APPOINTMENT (OUTPATIENT)
Dept: PHYSICAL THERAPY | Age: 77
End: 2022-01-31
Payer: MEDICARE

## 2022-02-01 ENCOUNTER — APPOINTMENT (OUTPATIENT)
Dept: PHYSICAL THERAPY | Age: 77
End: 2022-02-01
Attending: PHYSICAL MEDICINE & REHABILITATION
Payer: MEDICARE

## 2022-02-01 ENCOUNTER — TELEPHONE (OUTPATIENT)
Dept: PHYSICAL THERAPY | Facility: HOSPITAL | Age: 77
End: 2022-02-01

## 2022-02-07 RX ORDER — TRAZODONE HYDROCHLORIDE 150 MG/1
TABLET ORAL
Qty: 90 TABLET | Refills: 0 | OUTPATIENT
Start: 2022-02-07

## 2022-02-08 ENCOUNTER — OFFICE VISIT (OUTPATIENT)
Dept: INTERNAL MEDICINE CLINIC | Facility: CLINIC | Age: 77
End: 2022-02-08
Payer: MEDICARE

## 2022-02-08 ENCOUNTER — TELEPHONE (OUTPATIENT)
Dept: INTERNAL MEDICINE CLINIC | Facility: CLINIC | Age: 77
End: 2022-02-08

## 2022-02-08 ENCOUNTER — APPOINTMENT (OUTPATIENT)
Dept: PHYSICAL THERAPY | Age: 77
End: 2022-02-08
Attending: PHYSICAL MEDICINE & REHABILITATION
Payer: MEDICARE

## 2022-02-08 VITALS
HEIGHT: 60.5 IN | TEMPERATURE: 97 F | DIASTOLIC BLOOD PRESSURE: 68 MMHG | BODY MASS INDEX: 26.36 KG/M2 | SYSTOLIC BLOOD PRESSURE: 124 MMHG | HEART RATE: 86 BPM | RESPIRATION RATE: 14 BRPM | OXYGEN SATURATION: 95 % | WEIGHT: 137.81 LBS

## 2022-02-08 DIAGNOSIS — L03.113 CELLULITIS OF RIGHT UPPER EXTREMITY: Primary | ICD-10-CM

## 2022-02-08 DIAGNOSIS — S61.451A DOG BITE OF RIGHT HAND, INITIAL ENCOUNTER: ICD-10-CM
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
## 2022-02-08 PROCEDURE — 90471 IMMUNIZATION ADMIN: CPT | Performed by: NURSE PRACTITIONER

## 2022-02-08 PROCEDURE — 99214 OFFICE O/P EST MOD 30 MIN: CPT | Performed by: NURSE PRACTITIONER

## 2022-02-08 PROCEDURE — 90715 TDAP VACCINE 7 YRS/> IM: CPT | Performed by: NURSE PRACTITIONER

## 2022-02-08 RX ORDER — SULFAMETHOXAZOLE AND TRIMETHOPRIM 800; 160 MG/1; MG/1
1 TABLET ORAL 2 TIMES DAILY
Qty: 14 TABLET | Refills: 0 | Status: SHIPPED | OUTPATIENT
Start: 2022-02-08 | End: 2022-02-15

## 2022-02-08 NOTE — TELEPHONE ENCOUNTER
Patient calling stating her dog bit her right hand yesterday. Pt stated she cleaned the wound and put a bandaid on it. Patient stating there hand is very swollen and blue. Patient went to pharm for antibacterial cream and the pharmacist told her to call the doc  Denies redness  Slightly warm to the tough  Denies numbness or tingling  Patient stated 5/10 pain   Pt put tea tree oil on the wound. Advised appt.  Pt coming in today at 3pm

## 2022-02-10 ENCOUNTER — TELEPHONE (OUTPATIENT)
Dept: PHYSICAL THERAPY | Facility: HOSPITAL | Age: 77
End: 2022-02-10

## 2022-02-15 ENCOUNTER — APPOINTMENT (OUTPATIENT)
Dept: PHYSICAL THERAPY | Age: 77
End: 2022-02-15
Attending: PHYSICAL MEDICINE & REHABILITATION
Payer: MEDICARE

## 2022-02-15 ENCOUNTER — TELEPHONE (OUTPATIENT)
Dept: PHYSICAL THERAPY | Facility: HOSPITAL | Age: 77
End: 2022-02-15

## 2022-02-21 ENCOUNTER — TELEPHONE (OUTPATIENT)
Dept: INTERNAL MEDICINE CLINIC | Facility: CLINIC | Age: 77
End: 2022-02-21

## 2022-02-21 ENCOUNTER — OFFICE VISIT (OUTPATIENT)
Dept: INTERNAL MEDICINE CLINIC | Facility: CLINIC | Age: 77
End: 2022-02-21
Payer: MEDICARE

## 2022-02-21 ENCOUNTER — HOSPITAL ENCOUNTER (OUTPATIENT)
Dept: GENERAL RADIOLOGY | Age: 77
Discharge: HOME | End: 2022-02-21
Attending: NURSE PRACTITIONER
Payer: MEDICARE

## 2022-02-21 VITALS
BODY MASS INDEX: 26.2 KG/M2 | TEMPERATURE: 99 F | DIASTOLIC BLOOD PRESSURE: 74 MMHG | HEART RATE: 91 BPM | OXYGEN SATURATION: 97 % | WEIGHT: 137 LBS | RESPIRATION RATE: 14 BRPM | HEIGHT: 60.5 IN | SYSTOLIC BLOOD PRESSURE: 132 MMHG

## 2022-02-21 DIAGNOSIS — L03.113 CELLULITIS OF RIGHT UPPER EXTREMITY: ICD-10-CM

## 2022-02-21 DIAGNOSIS — M81.0 AGE-RELATED OSTEOPOROSIS WITHOUT CURRENT PATHOLOGICAL FRACTURE: ICD-10-CM

## 2022-02-21 DIAGNOSIS — J41.0 SMOKERS' COUGH (HCC): ICD-10-CM

## 2022-02-21 DIAGNOSIS — F39 MOOD DISORDER (HCC): ICD-10-CM

## 2022-02-21 DIAGNOSIS — Z72.0 TOBACCO ABUSE: ICD-10-CM

## 2022-02-21 DIAGNOSIS — J41.0 SMOKERS' COUGH (HCC): Primary | ICD-10-CM

## 2022-02-21 DIAGNOSIS — E78.2 MIXED HYPERLIPIDEMIA: ICD-10-CM

## 2022-02-21 PROCEDURE — 99406 BEHAV CHNG SMOKING 3-10 MIN: CPT | Performed by: NURSE PRACTITIONER

## 2022-02-21 PROCEDURE — 99214 OFFICE O/P EST MOD 30 MIN: CPT | Performed by: NURSE PRACTITIONER

## 2022-02-21 RX ORDER — TRAZODONE HYDROCHLORIDE 150 MG/1
150 TABLET ORAL NIGHTLY
Qty: 90 TABLET | Refills: 1 | Status: SHIPPED | OUTPATIENT
Start: 2022-02-21

## 2022-02-21 RX ORDER — ATORVASTATIN CALCIUM 20 MG/1
20 TABLET, FILM COATED ORAL NIGHTLY
Qty: 90 TABLET | Refills: 1 | Status: SHIPPED | OUTPATIENT
Start: 2022-02-21

## 2022-02-21 RX ORDER — BUPROPION HYDROCHLORIDE 150 MG/1
150 TABLET ORAL DAILY
Qty: 90 TABLET | Refills: 0 | Status: SHIPPED | OUTPATIENT
Start: 2022-02-21

## 2022-02-21 RX ORDER — SERTRALINE HYDROCHLORIDE 100 MG/1
200 TABLET, FILM COATED ORAL DAILY
Qty: 180 TABLET | Refills: 1 | Status: SHIPPED | OUTPATIENT
Start: 2022-02-21

## 2022-02-21 NOTE — TELEPHONE ENCOUNTER
Per Jose Alfredo  nicotine polacrilex (NICORETTE STARTER KIT) 4 MG Mouth/Throat Gum needs a specified time frame for  PRN per insurance.  Thank you

## 2022-02-22 ENCOUNTER — OFFICE VISIT (OUTPATIENT)
Dept: PHYSICAL THERAPY | Age: 77
End: 2022-02-22
Attending: PHYSICAL MEDICINE & REHABILITATION
Payer: MEDICARE

## 2022-02-22 PROCEDURE — 97140 MANUAL THERAPY 1/> REGIONS: CPT

## 2022-02-22 PROCEDURE — 97110 THERAPEUTIC EXERCISES: CPT

## 2022-02-22 NOTE — PROGRESS NOTES
Dx: Postural Syndrome         Authorized # of Visits:  12         Next MD visit: none scheduled  Fall Risk: standard         Precautions: n/a      Last MD Note: 12/14/2021     Goal Number: 12    Subjective:Feeling good. Had an issue with her hand with a dog bite. Purchased a physioball for ROM at home to the thoracic spine. Objective: Treatment progressed per treatment log. Date:               TX#: 5/ Date:1/17/2022  TX#: 6/ Date: 1/20/2022  TX#: 7/ Date:1/25/2022  TX#: 3/6 2/22/2022  TX: 4/6     VAS 2/10 VAS 4/10 VAS /10 VAS /10 VAS2 /10     TherEx (23 Min) TherEx (23 Min) TherEx (23 Min) TherEx (23 Min) TherEx (23 Min)     UBE 6 min 3/3 - Level 2 UBE 6 min 3/3 - Level 2 UBE 6 min 3/3 - Level 2 Open book stretch 10 sec x 5 UBE 6 min 3/3 - Level 2     Supine Rows/ Diagonals red x 20 Supine Rows/ Diagonals red x 20 Supine Rows/ Diagonals red x 20 LTR x 10  Supine Rows/ Diagonals red x 20      Cane Overhead reach x 20  Seated ball rolls  Seated ball rolls x 10     Wall Wash x 15 Wall Wash x 15 Wall Wash x 15 Wall Wash x 15 Wall Wash x 15     Recip Rows red x 20 Recip Rows red x 20 Recip Rows red x 20  Recip Rows red x 20      Scapular \"w\" exercise x 20 Scapular \"w\" exercise x 20  Scapular \"w\" exercise x 20              Manual PT (20 Min) Manual PT (25 Min) Manual PT (25 Min) Manual PT (25 Min) Manual PT (25 Min)     PROM to cervical spine and shoulder to tolerance. PROM to cervical spine and shoulder to tolerance. PROM to cervical spine and shoulder to tolerance. Left SL Right rib mobs into rotation and sidebendng. Left SL Right rib mobs into rotation and sidebendng. Seated thoracic PA manipulation Seated thoracic PA manipulation Seated thoracic PA manipulation Mid thoracic PA rib manipulation Mid thoracic PA rib manipulation      Mid thoracic PA rib manipulation Mid thoracic PA rib manipulation         Assessment: The patient responded well to today's intervention.  Thoracic motion is improved over last session. Goals (12 visits):    1. Increase FOTO assessment > 11% from INE to DC. 2. Patient will be aware of postural limitations and be able to correct them independently. 3. Patient will have an increase in spinal mobility to panfree symmetry bilaterally in order to return to better ability to sustain sitting and walking postures. .    4. Patient will have an increase in core strength to assist with returning to sustained sitting and ambulation  5. Patient will demonstrate an increase in MLT of the anterior thorax and anterior hip musculatre in order to return to sustained ambulation and posturing. Plan: Assess response to today's treatment and progress as tolerated. Charges: TherEx 2 (25 min);  Manual PT 2 (25 min)      Total Timed Treatment: 50 min  Total Treatment Time: 50 min

## 2022-02-23 ENCOUNTER — HOSPITAL ENCOUNTER (OUTPATIENT)
Dept: GENERAL RADIOLOGY | Age: 77
Discharge: HOME | End: 2022-02-23
Attending: NURSE PRACTITIONER
Payer: MEDICARE

## 2022-02-23 ENCOUNTER — TELEPHONE (OUTPATIENT)
Dept: INTERNAL MEDICINE CLINIC | Facility: CLINIC | Age: 77
End: 2022-02-23

## 2022-02-23 PROCEDURE — 71046 X-RAY EXAM CHEST 2 VIEWS: CPT | Performed by: NURSE PRACTITIONER

## 2022-03-02 ENCOUNTER — TELEPHONE (OUTPATIENT)
Dept: INTERNAL MEDICINE CLINIC | Facility: CLINIC | Age: 77
End: 2022-03-02

## 2022-03-02 NOTE — TELEPHONE ENCOUNTER
Pts referral for Dr Allyson Pastrana is not completed and still open.  Pt calling to check on status of this referral   Please advise  Thank you

## 2022-03-02 NOTE — TELEPHONE ENCOUNTER
Dickson Meraz RN  [facility]     Referral has been approved. Pt is set to go.      Thank you,   Tracey     Pt notified, left detailed message on VM

## 2022-03-04 ENCOUNTER — TELEPHONE (OUTPATIENT)
Dept: PHYSICAL THERAPY | Facility: HOSPITAL | Age: 77
End: 2022-03-04

## 2022-03-04 ENCOUNTER — APPOINTMENT (OUTPATIENT)
Dept: PHYSICAL THERAPY | Age: 77
End: 2022-03-04
Attending: PHYSICAL MEDICINE & REHABILITATION
Payer: MEDICARE

## 2022-03-09 ENCOUNTER — OFFICE VISIT (OUTPATIENT)
Dept: PHYSICAL THERAPY | Age: 77
End: 2022-03-09
Attending: PHYSICAL MEDICINE & REHABILITATION
Payer: MEDICARE

## 2022-03-09 PROCEDURE — 97110 THERAPEUTIC EXERCISES: CPT

## 2022-03-09 PROCEDURE — 97140 MANUAL THERAPY 1/> REGIONS: CPT

## 2022-03-09 NOTE — PROGRESS NOTES
Dx: Postural Syndrome         Authorized # of Visits:  12         Next MD visit: none scheduled  Fall Risk: standard         Precautions: n/a      Last MD Note: 12/14/2021     Goal Number: 12    Subjective:Feeling good. Doing much better with cervical ROM. Less pain with end range motions. Objective: Treatment progressed per treatment log. Date:               TX#: 5/ Date:1/17/2022  TX#: 6/ Date: 1/20/2022  TX#: 7/ Date:1/25/2022  TX#: 3/6 2/22/2022  TX: 4/6 3/9/2022  5/6    VAS 2/10 VAS 4/10 VAS /10 VAS /10 VAS2 /10 VAS2 /10    TherEx (23 Min) TherEx (23 Min) TherEx (23 Min) TherEx (23 Min) TherEx (23 Min) TherEx (23 Min)    UBE 6 min 3/3 - Level 2 UBE 6 min 3/3 - Level 2 UBE 6 min 3/3 - Level 2 Open book stretch 10 sec x 5 UBE 6 min 3/3 - Level 2 UBE 6 min 3/3 - Level 2    Supine Rows/ Diagonals red x 20 Supine Rows/ Diagonals red x 20 Supine Rows/ Diagonals red x 20 LTR x 10  Supine Rows/ Diagonals red x 20 Supine Rows/ Diagonals red x 20     Cane Overhead reach x 20  Seated ball rolls  Seated ball rolls x 10 Seated ball rolls x 10    Wall Wash x 15 Wall Wash x 15 Wall Wash x 15 Wall Wash x 15 Wall Wash x 15 Wall Wash x 15    Recip Rows red x 20 Recip Rows red x 20 Recip Rows red x 20  Recip Rows red x 20 Recip Rows red x 20     Scapular \"w\" exercise x 20 Scapular \"w\" exercise x 20  Scapular \"w\" exercise x 20 Scapular \"w\" exercise x 20             Manual PT (20 Min) Manual PT (25 Min) Manual PT (25 Min) Manual PT (25 Min) Manual PT (25 Min) Manual PT (25 Min)    PROM to cervical spine and shoulder to tolerance. PROM to cervical spine and shoulder to tolerance. PROM to cervical spine and shoulder to tolerance. Left SL Right rib mobs into rotation and sidebendng. Left SL Right rib mobs into rotation and sidebendng. Left SL Right rib mobs into rotation and sidebendng.      Seated thoracic PA manipulation Seated thoracic PA manipulation Seated thoracic PA manipulation Mid thoracic PA rib manipulation Mid thoracic PA rib manipulation Mid thoracic PA rib manipulation     Mid thoracic PA rib manipulation Mid thoracic PA rib manipulation         Assessment: The patient responded well to today's intervention. Increased cervical ROM for all planes of motion assessed. Goals (12 visits):    1. Increase FOTO assessment > 11% from INE to DC. 2. Patient will be aware of postural limitations and be able to correct them independently. 3. Patient will have an increase in spinal mobility to panfree symmetry bilaterally in order to return to better ability to sustain sitting and walking postures. .    4. Patient will have an increase in core strength to assist with returning to sustained sitting and ambulation  5. Patient will demonstrate an increase in MLT of the anterior thorax and anterior hip musculatre in order to return to sustained ambulation and posturing. Plan: Assess response to today's treatment and progress as tolerated. Charges: TherEx 2 (25 min);  Manual PT 2 (25 min)      Total Timed Treatment: 50 min  Total Treatment Time: 50 min

## 2022-03-16 ENCOUNTER — OFFICE VISIT (OUTPATIENT)
Dept: PHYSICAL THERAPY | Age: 77
End: 2022-03-16
Attending: PHYSICAL MEDICINE & REHABILITATION
Payer: MEDICARE

## 2022-03-16 PROCEDURE — 97140 MANUAL THERAPY 1/> REGIONS: CPT

## 2022-03-16 PROCEDURE — 97110 THERAPEUTIC EXERCISES: CPT

## 2022-03-22 ENCOUNTER — APPOINTMENT (OUTPATIENT)
Dept: PHYSICAL THERAPY | Age: 77
End: 2022-03-22
Attending: PHYSICAL MEDICINE & REHABILITATION
Payer: MEDICARE

## 2022-03-31 DIAGNOSIS — Z72.0 TOBACCO ABUSE: ICD-10-CM

## 2022-04-01 RX ORDER — BUPROPION HYDROCHLORIDE 150 MG/1
TABLET ORAL
Qty: 90 TABLET | Refills: 0 | OUTPATIENT
Start: 2022-04-01

## 2022-04-01 NOTE — TELEPHONE ENCOUNTER
Last OV pertinent to medication 2/21/22  Last refill date:2/21/22 #/refills: 100 each w/ 0  When patient was asked to return for OV: None noted   Upcomming appt/reason: None scheduled  Labs: NA

## 2022-04-04 RX ORDER — BUPROPION HYDROCHLORIDE 150 MG/1
TABLET ORAL
Qty: 90 TABLET | Refills: 0 | OUTPATIENT
Start: 2022-04-04

## 2022-04-25 ENCOUNTER — TELEPHONE (OUTPATIENT)
Dept: ENDOCRINOLOGY CLINIC | Facility: CLINIC | Age: 77
End: 2022-04-25

## 2022-04-25 NOTE — TELEPHONE ENCOUNTER
Per provider, provider will be leaving early on May 18th. Pt currently has an appt fro 5/18/22 at 12. Called pt to help reschedule, no answer, lmtcb.

## 2022-05-02 RX ORDER — BUPROPION HYDROCHLORIDE 150 MG/1
TABLET ORAL
Qty: 90 TABLET | Refills: 0 | OUTPATIENT
Start: 2022-05-02

## 2022-05-04 RX ORDER — BUPROPION HYDROCHLORIDE 150 MG/1
TABLET ORAL
Qty: 90 TABLET | Refills: 0 | OUTPATIENT
Start: 2022-05-04

## 2022-05-05 ENCOUNTER — TELEPHONE (OUTPATIENT)
Dept: ENDOCRINOLOGY CLINIC | Facility: CLINIC | Age: 77
End: 2022-05-05

## 2022-05-06 ENCOUNTER — TELEPHONE (OUTPATIENT)
Dept: ENDOCRINOLOGY CLINIC | Facility: CLINIC | Age: 77
End: 2022-05-06

## 2022-05-06 ENCOUNTER — LAB ENCOUNTER (OUTPATIENT)
Dept: LAB | Facility: HOSPITAL | Age: 77
End: 2022-05-06
Attending: INTERNAL MEDICINE
Payer: MEDICARE

## 2022-05-06 ENCOUNTER — OFFICE VISIT (OUTPATIENT)
Dept: ENDOCRINOLOGY CLINIC | Facility: CLINIC | Age: 77
End: 2022-05-06
Payer: MEDICARE

## 2022-05-06 VITALS
SYSTOLIC BLOOD PRESSURE: 123 MMHG | HEART RATE: 92 BPM | DIASTOLIC BLOOD PRESSURE: 76 MMHG | BODY MASS INDEX: 27 KG/M2 | WEIGHT: 142.81 LBS

## 2022-05-06 DIAGNOSIS — M81.0 AGE-RELATED OSTEOPOROSIS WITHOUT CURRENT PATHOLOGICAL FRACTURE: ICD-10-CM

## 2022-05-06 DIAGNOSIS — E55.9 VITAMIN D DEFICIENCY: Primary | ICD-10-CM

## 2022-05-06 LAB
ALBUMIN SERPL-MCNC: 3.8 G/DL (ref 3.4–5)
ALBUMIN/GLOB SERPL: 1.1 {RATIO} (ref 1–2)
ALP LIVER SERPL-CCNC: 69 U/L
ALT SERPL-CCNC: 19 U/L
ANION GAP SERPL CALC-SCNC: 6 MMOL/L (ref 0–18)
AST SERPL-CCNC: 12 U/L (ref 15–37)
BILIRUB SERPL-MCNC: 0.3 MG/DL (ref 0.1–2)
BUN BLD-MCNC: 26 MG/DL (ref 7–18)
BUN/CREAT SERPL: 31.7 (ref 10–20)
CALCIUM BLD-MCNC: 9.7 MG/DL (ref 8.5–10.1)
CHLORIDE SERPL-SCNC: 108 MMOL/L (ref 98–112)
CO2 SERPL-SCNC: 28 MMOL/L (ref 21–32)
CREAT BLD-MCNC: 0.82 MG/DL
FASTING STATUS PATIENT QL REPORTED: NO
GLOBULIN PLAS-MCNC: 3.4 G/DL (ref 2.8–4.4)
GLUCOSE BLD-MCNC: 94 MG/DL (ref 70–99)
OSMOLALITY SERPL CALC.SUM OF ELEC: 299 MOSM/KG (ref 275–295)
POTASSIUM SERPL-SCNC: 3.9 MMOL/L (ref 3.5–5.1)
PROT SERPL-MCNC: 7.2 G/DL (ref 6.4–8.2)
PTH-INTACT SERPL-MCNC: 45.2 PG/ML (ref 18.5–88)
SODIUM SERPL-SCNC: 142 MMOL/L (ref 136–145)
VIT D+METAB SERPL-MCNC: 62 NG/ML (ref 30–100)

## 2022-05-06 PROCEDURE — 36415 COLL VENOUS BLD VENIPUNCTURE: CPT

## 2022-05-06 PROCEDURE — 84080 ASSAY ALKALINE PHOSPHATASES: CPT

## 2022-05-06 PROCEDURE — 99203 OFFICE O/P NEW LOW 30 MIN: CPT | Performed by: INTERNAL MEDICINE

## 2022-05-06 PROCEDURE — 82306 VITAMIN D 25 HYDROXY: CPT | Performed by: INTERNAL MEDICINE

## 2022-05-06 PROCEDURE — 80053 COMPREHEN METABOLIC PANEL: CPT

## 2022-05-06 PROCEDURE — 83970 ASSAY OF PARATHORMONE: CPT

## 2022-05-06 NOTE — TELEPHONE ENCOUNTER
Per provider,     Please start Prolia IV  Pt's last injection was with [facility] in August 2022. Pt lives in [place] and is a far drive to our location. Pt was given Dr. Lorena Culp MD [address] [place] [address] since it's closer to patient.

## 2022-05-09 NOTE — TELEPHONE ENCOUNTER
Received SOB via [employer] portal.    PA Required: Yes  Prolia OOP COST: 20%  Facility Fee: No  Admin Fee: 20%    PA form was printed and filled out. Left on providers desk for review and signature. Pt's LOV Clinical notes will be attached    Dr. Armen Richardson from 5/6/22 note is currently unsigned please sign note to send with PA.

## 2022-05-10 LAB — BONE SPECIFIC ALKALINE PHOSPHATASE: 6.8 UG/L

## 2022-05-11 ENCOUNTER — TELEPHONE (OUTPATIENT)
Dept: ENDOCRINOLOGY CLINIC | Facility: CLINIC | Age: 77
End: 2022-05-11

## 2022-05-11 NOTE — TELEPHONE ENCOUNTER
Please let the patient know that we are working on prolia PA  Will let her know once we hear back from her insurance    Her calcium vitamin D and parathyroid hormone levels are normal    Thanks

## 2022-05-18 NOTE — TELEPHONE ENCOUNTER
Spoke with patient on phone and reviewed Dr. Emily Cleary result notes. Reviewed that office is working on PA for [employer]- signed form faxed yesterday 5/17. Will hear from our office with update on this when we have one. Patient verbalized understanding of all. Will close this encounter.

## 2022-05-19 ENCOUNTER — TELEPHONE (OUTPATIENT)
Dept: ENDOCRINOLOGY CLINIC | Facility: CLINIC | Age: 77
End: 2022-05-19

## 2022-05-19 NOTE — TELEPHONE ENCOUNTER
Received faxed prior authorization approval from [facility] OF THE [facility] for patient prolia therapy.     Authorization number: [id number]     Authorization is valid from 8/30/21-12/31/22    Patient's last prolia injection was administered by [facility] on 8/26/21 (see care everwhere)

## 2022-05-19 NOTE — TELEPHONE ENCOUNTER
Received fax from [facility] [employer]. Attached is the approval for Prolia until the end of the year, 12/31/2022    Per Dr Megan Quintanilla, schedule with Dr Ponce Rene, THE [facility] Endocrinology Provider. [address] [facility] Endocrinology [place] to see if approval can be faxed over to there office. Awaiting response.

## 2022-05-24 DIAGNOSIS — Z72.0 TOBACCO ABUSE: ICD-10-CM

## 2022-05-25 RX ORDER — BUPROPION HYDROCHLORIDE 150 MG/1
TABLET ORAL
Qty: 90 TABLET | Refills: 0 | Status: SHIPPED | OUTPATIENT
Start: 2022-05-25

## 2022-05-25 NOTE — TELEPHONE ENCOUNTER
Last OV relevant to medication: 2/21/2022   Last refill date: 2/21/2022    #/refills: 90-0  When pt was asked to return for OV:Follow up in July for annual visit or sooner as needed  Upcoming appt/reason: n/a  Was pt informed of any over due labs: n/a

## 2024-09-04 NOTE — PROGRESS NOTES
Discharge Summary  Pt has attended 6 visits in Physical Therapy. Dx: Postural Syndrome         Authorized # of Visits:  12         Next MD visit: none scheduled  Fall Risk: standard         Precautions: n/a      Last MD Note: 12/14/2021     Goal Number: 12    Subjective:Feeling good. States that she has made great progress since initiating PT and feels that she has met all of her goals. Feels that she is mostly painfree at this time. Objective: Cervical ROM is >75% full for all planes of motion. Thoracic extension has increased. No UE findings at this time. Assessment: Overall, the patient has done very well with PT. She has demonstrated marked improvements in her cervicothoracic ROM and with less pain. She has a HEP for continued strengthening at home. During her entire rehab session, she has struggled with vertigo type symptoms and may benefit from rehabilitation from a vertigo specialist to better address this issue. Goals (12 visits):    2. Patient will be aware of postural limitations and be able to correct them independently. (MET)    3. Patient will have an increase in spinal mobility to panfree symmetry bilaterally in order to return to better ability to sustain sitting and walking postures. .  (MET)   4. Patient will have an increase in core strength to assist with returning to sustained sitting and ambulation(MET)   5. Patient will demonstrate an increase in MLT of the anterior thorax and anterior hip musculatre in order to return to sustained ambulation and posturing.  (MET)       Plan: Discontinue skilled Physical Therapy      Patient/Family/Caregiver was advised of these findings, precautions, and treatment options and has agreed to actively participate in planning and for this course of care. Thank you for your referral. If you have any questions, please contact me at Dept: [phone].     Sincerely,  Electronically signed by therapist: Abrahan Elder PT          Charges: TherEx 2 (25 min); Manual PT 2 (25 min)      Total Timed Treatment: 50 min  Total Treatment Time: 50 min        Date:               TX#: 5/ Date:1/17/2022  TX#: 6/ Date: 1/20/2022  TX#: 7/ Date:1/25/2022  TX#: 3/6 2/22/2022  TX: 4/6 3/9/2022  5/6 3/16/2022  6/6   VAS 2/10 VAS 4/10 VAS /10 VAS /10 VAS2 /10 VAS2 /10 VAS 0/10   TherEx (23 Min) TherEx (23 Min) TherEx (23 Min) TherEx (23 Min) TherEx (23 Min) TherEx (23 Min) TherEx (23 Min)   UBE 6 min 3/3 - Level 2 UBE 6 min 3/3 - Level 2 UBE 6 min 3/3 - Level 2 Open book stretch 10 sec x 5 UBE 6 min 3/3 - Level 2 UBE 6 min 3/3 - Level 2 UBE 6 min 3/3 - Level 2   Supine Rows/ Diagonals red x 20 Supine Rows/ Diagonals red x 20 Supine Rows/ Diagonals red x 20 LTR x 10  Supine Rows/ Diagonals red x 20 Supine Rows/ Diagonals red x 20 Supine Rows/ Diagonals red x 20    Cane Overhead reach x 20  Seated ball rolls  Seated ball rolls x 10 Seated ball rolls x 10 Seated ball rolls x 10   Wall Wash x 15 Wall Wash x 15 Wall Wash x 15 Wall Wash x 15 Wall Wash x 15 Wall Wash x 15 Wall Wash x 15   Recip Rows red x 20 Recip Rows red x 20 Recip Rows red x 20  Recip Rows red x 20 Recip Rows red x 20 Recip Rows red x 20    Scapular \"w\" exercise x 20 Scapular \"w\" exercise x 20  Scapular \"w\" exercise x 20 Scapular \"w\" exercise x 20 Scapular \"w\" exercise x 20            Manual PT (20 Min) Manual PT (25 Min) Manual PT (25 Min) Manual PT (25 Min) Manual PT (25 Min) Manual PT (25 Min) Manual PT (25 Min)   PROM to cervical spine and shoulder to tolerance. PROM to cervical spine and shoulder to tolerance. PROM to cervical spine and shoulder to tolerance. Left SL Right rib mobs into rotation and sidebendng. Left SL Right rib mobs into rotation and sidebendng. Left SL Right rib mobs into rotation and sidebendng. Left SL Right rib mobs into rotation and sidebendng.     Seated thoracic PA manipulation Seated thoracic PA manipulation Seated thoracic PA manipulation Mid thoracic PA rib manipulation Mid thoracic PA rib manipulation Mid thoracic PA rib manipulation Mid thoracic PA rib manipulation    Mid thoracic PA rib manipulation Mid thoracic PA rib manipulation [Joint Pain] : joint pain [Negative] : Heme/Lymph

## 2025-02-12 NOTE — TELEPHONE ENCOUNTER
Last OV relevant to medication: 11/14/18  Last refill date: 11/14/18     #/refills: 270/0  When pt was asked to return for OV: 3 months  Upcoming appt/reason: none, patient due for physical    Routed to [facility] to schedule PE, then will forward to  Patients daughter called office today requesting a urine test to be placed in the chart due to UTI symptoms starting again. Daughter advises that since last Thursday she has been taking AZO for symptoms but has not improved. Daughter advises that she is more fatigued. She advises that she has supplies at home to take to the lab due to it being hard to transport patient please advise.

## (undated) DIAGNOSIS — Z72.0 TOBACCO ABUSE: ICD-10-CM

## (undated) DEVICE — SOL  .9 3000ML

## (undated) DEVICE — STAPLER 60: Brand: SUREFORM

## (undated) DEVICE — SUREFORM 45 RELOAD BLUE: Brand: SUREFORM

## (undated) DEVICE — COLUMN DRAPE

## (undated) DEVICE — LIGHT HANDLE

## (undated) DEVICE — LAP COLON CDS: Brand: MEDLINE INDUSTRIES, INC.

## (undated) DEVICE — DRAIN RELIAVAC 100CC

## (undated) DEVICE — VESSEL SEALER EXTEND: Brand: ENDOWRIST

## (undated) DEVICE — VISUALIZATION SYSTEM: Brand: CLEARIFY

## (undated) DEVICE — SUTURE VICRYL 5-0 PC-1

## (undated) DEVICE — PERMANENT CAUTERY HOOK: Brand: ENDOWRIST

## (undated) DEVICE — TIBURON DRAPE TOWELS: Brand: CONVERTORS

## (undated) DEVICE — SUTURE PROLENE 2-0 SH

## (undated) DEVICE — TIP-UP FENESTRATED GRASPER: Brand: ENDOWRIST

## (undated) DEVICE — BAG DRAIN INFECTION CNTRL 2000

## (undated) DEVICE — TUBING CYSTO

## (undated) DEVICE — SUTURE VICRYL 0

## (undated) DEVICE — SUTURE PDS II 1 CTX

## (undated) DEVICE — SUREFORM 45: Brand: SUREFORM

## (undated) DEVICE — CLOSING BUNDLE: Brand: MEDLINE INDUSTRIES, INC.

## (undated) DEVICE — MONOFILAMENT ABSORBABLE SUTURE: Brand: MAXON

## (undated) DEVICE — 40580 - THE PINK PAD - ADVANCED TRENDELENBURG POSITIONING KIT: Brand: 40580 - THE PINK PAD - ADVANCED TRENDELENBURG POSITIONING KIT

## (undated) DEVICE — DRAIN CHANNEL 19FR BLAKE

## (undated) DEVICE — Device

## (undated) DEVICE — VIOLET BRAIDED (POLYGLACTIN 910), SYNTHETIC ABSORBABLE SUTURE: Brand: COATED VICRYL

## (undated) DEVICE — SCD SLEEVE KNEE HI BLEND

## (undated) DEVICE — COVER,MAYO STAND,STERILE: Brand: MEDLINE

## (undated) DEVICE — GOWN,SIRUS,FABRIC-REINFORCED,X-LARGE: Brand: MEDLINE

## (undated) DEVICE — SUTURE ETHILON 2-0 FS

## (undated) DEVICE — BLADELESS OBTURATOR: Brand: WECK VISTA

## (undated) DEVICE — ARM DRAPE

## (undated) DEVICE — REDUCER: Brand: ENDOWRIST

## (undated) DEVICE — STAPLER CIRCULAR ENDO 29MM

## (undated) DEVICE — SUTURE PDS II 1 CT-1

## (undated) DEVICE — ENDOPATH ULTRA VERESS INSUFFLATION NEEDLES WITH LUER LOCK CONNECTORS: Brand: ENDOPATH

## (undated) DEVICE — DRAPE EQUIPMENT INTRATEMP THER

## (undated) DEVICE — CADIERE FORCEPS: Brand: ENDOWRIST

## (undated) DEVICE — SEAL

## (undated) DEVICE — TROCAR: Brand: KII SHIELDED BLADED ACCESS SYSTEM

## (undated) DEVICE — SIGMOIDOSCOPE LIGHTED BIOSEAL

## (undated) DEVICE — BETADINE SOLUTION 8 OZ BTL

## (undated) DEVICE — CANNULA SEAL

## (undated) DEVICE — MEDI-VAC TUBING CONNECTOR 6-IN-1 "Y" POLYPROPYLENE: Brand: CARDINAL HEALTH

## (undated) DEVICE — POUCH SPECIMEN WIRE 6X3 250ML

## (undated) DEVICE — STERILE POLYISOPRENE POWDER-FREE SURGICAL GLOVES: Brand: PROTEXIS

## (undated) NOTE — LETTER
Date: 4/22/2019    Patient Name: Whit Crump          To Whom it may concern: The above patient was seen at the [facility] for treatment of a medical condition.  Ms Yaneli Lira has chronic shoulder pain with very limited mobility and it is re

## (undated) NOTE — LETTER
Patient Name: Amrit Chaudhary  YOB: 1945          MRN number:  [id number]  Date:  12/14/2021  Referring Physician:  Kiki Donald         SPINE EVALUATION:    Referring Physician: Dr. Wanda Mayo  Diagnosis: Myofascial Postural Impairments affec functional mobility. Precautions:  None  OBJECTIVE:   Observation/Posture: The patient presents with a structural assessment of an increase in thoracic kyphosis and lumbar lordosis. Mild increase in anterior pelvic tilt.    Gait: Decreased hip extensio agreement with FOTO score and clinical rationale, this evaluation involved Low Complexity decision making due to 1-2 personal factors/comorbidities. PLAN OF CARE:    Goals (12 visits):    1. Increase FOTO assessment > 11% from INE to DC.   2. Patient zacarias

## (undated) NOTE — LETTER
21    Patient: Bruno Del Cid  : 1945 Visit date: 2021    Dear  Elizabeth Marshall MD    Thank you for referring Zuleikachavez Del Cid to my practice. Please find my assessment and plan below.     Assessment   Lower abdominal pain  (primary enco spleen is not palpable. There are no inguinal, umbilical, or incisional hernias present. I fully reviewed all of the above with the patient including her most recent air-contrast barium enema.     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

## (undated) NOTE — LETTER
February 8, 2018    Patient: [facility]   Date of Visit: 2/8/2018       To Whom It May Concern:    Mikal Dan was seen and treated in our emergency department on 2/8/2018.  She should not return to work until evaluated by [address]

## (undated) NOTE — LETTER
21    Patient: Pasha Rubio  : 1945 Visit date: 2021    Dear  Corinne Acton, MD    Thank you for referring Pasha Rubio to my practice. Please find my assessment and plan below.       Assessment   Diverticulosis  (primary encounter diarrhea. She has had no nausea or vomiting.            Sincerely,       Alejandro Cerna MD   CC: No Recipients

## (undated) NOTE — LETTER
21    Patient: Sussy Garcia  : 1945 Visit date: 3/18/2021    Dear  Jennifer Caldera MD    Thank you for referring Sussy Garcia to my practice. Please find my assessment and plan below.     Assessment   Acute diverticulitis  (primary enco

## (undated) NOTE — LETTER
[address] [place] [address] Way   Date:   10/20/2021     Name:   Chelsea Colunga    YOB: 1945   MRN:   [id number]       WHERE IS YOUR PAIN NOW?   Дмитрий Valera the areas on your body where you feel the described

## (undated) NOTE — ED AVS SNAPSHOT
Ms. Jonah Clark   MRN: [id number]    Department:  [facility] Emergency Department   Date of Visit:  2/8/2018           Disclosure     Insurance plans vary and the physician(s) referred by the ER may not be covered by your plan.  Please contact tell this physician (or your personal doctor if your instructions are to return to your personal doctor) about any new or lasting problems. The primary care or specialist physician will see patients referred from the [facility] Emergency Department.  Jody Parisi

## (undated) NOTE — LETTER
Date: 2018    Patient Name: Sasha Velazco  : [birth year]        To Whom it may concern: The above patient was seen at the [facility] for treatment of a medical condition.     This patient should be excused from attending work  from [address]

## (undated) NOTE — LETTER
February 8, 2021     [address] [place] [id number]      Dear Lou Bile:    Below are the results from your recent visit:    Resulted Orders   BASIC METABOLIC PANEL (8)   Result Value Ref Range    Glucose 96 70 - 99 mg/dL    So

## (undated) NOTE — LETTER
[address] [place] [address] Way   Date:   12/1/2021     Name:   Lial Griffin    YOB: 1945   MRN:   [id number]       Chelsea?   Rahat Gandhi the areas on your body where you feel the described

## (undated) NOTE — LETTER
21    Patient: Sussy Garcia  : 1945 Visit date: 3/8/2021    Dear  Jennifer Caldera MD    Thank you for referring Sussy Garcia to my practice. Please find my assessment and plan below.     Assessment   Lower abdominal pain  (primary encou update. In the meantime she will take 3 doses of milk of magnesia 2 tablespoons each. She should take them tonight before she goes to bed. She will call our office tomorrow by noon to let us know how she is doing.     She is aware this will make her

## (undated) NOTE — LETTER
21    Patient: Carrie Evans  : 1945 Visit date: 3/4/2021    Dear  Radha Stokes MD    Thank you for referring Carrie Evans to my practice. Please find my assessment and plan below.     Assessment   Acute diverticulitis  (primary enco I also discussed with the patient that she may begin to advance her diet at this time at home. We want her eating a diet what ever allows her to have regular, soft bowel movements. We do not want her constipated or having diarrhea.     The patient will ne

## (undated) NOTE — LETTER
21    Patient: Norm Mtz  : 1945 Visit date: 3/2/2021    Dear  Zeina Boggs MD    Thank you for referring Norm Mtz to my practice. Please find my assessment and plan below.        Assessment   Acute diverticulitis  (primary e This patient has never had colonoscopy. She has had 2 previous abdominal operations. She had an appendectomy at age 24 through a right paramedian incision performed in [employer] or [employer].   She has a Pfannenstiel incision for a back injury that was performed a I will be starting this patient on Levaquin 500 mg daily for 14-day course. She will take Flagyl 250 mg 3 times daily for 14-day course. I will see this patient again in 48 hours on March 4, 2021.   She should not delay therapy to that visit if there are

## (undated) NOTE — LETTER
21    Patient: Mary Baron  : 1945 Visit date: 2021    Dear  Antonio Hazel MD    Thank you for referring Mary Loraine to my practice. Please find my assessment and plan below.       Assessment   Acute diverticulitis  (primary en